# Patient Record
Sex: FEMALE | Race: BLACK OR AFRICAN AMERICAN | NOT HISPANIC OR LATINO | Employment: PART TIME | URBAN - METROPOLITAN AREA
[De-identification: names, ages, dates, MRNs, and addresses within clinical notes are randomized per-mention and may not be internally consistent; named-entity substitution may affect disease eponyms.]

---

## 2019-01-16 ENCOUNTER — HISTORICAL (OUTPATIENT)
Dept: LAB | Facility: HOSPITAL | Age: 19
End: 2019-01-16

## 2019-01-16 LAB
ABS NEUT (OLG): 2.48 X10(3)/MCL (ref 2.1–9.2)
AMPHET UR QL SCN: NORMAL
APPEARANCE, UA: ABNORMAL
BACTERIA SPEC CULT: ABNORMAL /HPF
BARBITURATE SCN PRESENT UR: NORMAL
BASOPHILS # BLD AUTO: 0 X10(3)/MCL (ref 0–0.2)
BASOPHILS NFR BLD AUTO: 0 %
BENZODIAZ UR QL SCN: NORMAL
BILIRUB UR QL STRIP: NEGATIVE
CANNABINOIDS UR QL SCN: NORMAL
COCAINE UR QL SCN: NORMAL
COLOR UR: YELLOW
EOSINOPHIL # BLD AUTO: 0.2 X10(3)/MCL (ref 0–0.9)
EOSINOPHIL NFR BLD AUTO: 4 %
ERYTHROCYTE [DISTWIDTH] IN BLOOD BY AUTOMATED COUNT: 12.3 % (ref 11.5–17)
GLUCOSE (UA): NEGATIVE
GROUP & RH: NORMAL
HBV SURFACE AG SERPL QL IA: NEGATIVE
HCT VFR BLD AUTO: 39.5 % (ref 37–47)
HGB BLD-MCNC: 12.8 GM/DL (ref 12–16)
HGB UR QL STRIP: ABNORMAL
HIV 1+2 AB+HIV1 P24 AG SERPL QL IA: NEGATIVE
KETONES UR QL STRIP: NEGATIVE
LEUKOCYTE ESTERASE UR QL STRIP: ABNORMAL
LYMPHOCYTES # BLD AUTO: 1.8 X10(3)/MCL (ref 0.6–4.6)
LYMPHOCYTES NFR BLD AUTO: 38 %
MCH RBC QN AUTO: 30.4 PG (ref 27–31)
MCHC RBC AUTO-ENTMCNC: 32.4 GM/DL (ref 33–36)
MCV RBC AUTO: 93.8 FL (ref 80–94)
MONOCYTES # BLD AUTO: 0.3 X10(3)/MCL (ref 0.1–1.3)
MONOCYTES NFR BLD AUTO: 6 %
NEUTROPHILS # BLD AUTO: 2.48 X10(3)/MCL (ref 2.1–9.2)
NEUTROPHILS NFR BLD AUTO: 52 %
NITRITE UR QL STRIP: NEGATIVE
OPIATES UR QL SCN: NORMAL
PCP UR QL: NORMAL
PH UR STRIP.AUTO: 5.5 [PH] (ref 5–7.5)
PH UR STRIP: 5.5 [PH] (ref 5–9)
PLATELET # BLD AUTO: 330 X10(3)/MCL (ref 130–400)
PMV BLD AUTO: 10.6 FL (ref 9.4–12.4)
PROT UR QL STRIP: NEGATIVE
RBC # BLD AUTO: 4.21 X10(6)/MCL (ref 4.2–5.4)
RBC #/AREA URNS HPF: ABNORMAL /[HPF]
SP GR FLD REFRACTOMETRY: 1.02 (ref 1–1.03)
SP GR UR STRIP: 1.02 (ref 1–1.03)
SQUAMOUS EPITHELIAL, UA: 18 /HPF (ref 0–4)
T PALLIDUM AB SER QL: NORMAL
UROBILINOGEN UR STRIP-ACNC: 0.2
WBC # SPEC AUTO: 4.8 X10(3)/MCL (ref 4.5–11.5)
WBC #/AREA URNS HPF: 17 /HPF (ref 0–3)

## 2019-01-18 LAB — FINAL CULTURE: NORMAL

## 2022-04-13 ENCOUNTER — HISTORICAL (OUTPATIENT)
Dept: ADMINISTRATIVE | Facility: HOSPITAL | Age: 22
End: 2022-04-13

## 2022-07-22 ENCOUNTER — HOSPITAL ENCOUNTER (EMERGENCY)
Facility: HOSPITAL | Age: 22
Discharge: HOME | End: 2022-07-23
Attending: EMERGENCY MEDICINE
Payer: MEDICAID

## 2022-07-22 DIAGNOSIS — R11.2 NON-INTRACTABLE VOMITING WITH NAUSEA, UNSPECIFIED VOMITING TYPE: Primary | ICD-10-CM

## 2022-07-22 LAB — B-HCG SERPL QL: NEGATIVE

## 2022-07-22 PROCEDURE — 99283 EMERGENCY DEPT VISIT LOW MDM: CPT

## 2022-07-22 PROCEDURE — 81025 URINE PREGNANCY TEST: CPT | Performed by: EMERGENCY MEDICINE

## 2022-07-22 PROCEDURE — 25000003 PHARM REV CODE 250: Performed by: EMERGENCY MEDICINE

## 2022-07-22 RX ORDER — AMLODIPINE BESYLATE 10 MG/1
10 TABLET ORAL
COMMUNITY
Start: 2021-10-21 | End: 2024-03-18

## 2022-07-22 RX ORDER — BUPROPION HYDROCHLORIDE 150 MG/1
150 TABLET ORAL
COMMUNITY
Start: 2021-10-21

## 2022-07-22 RX ORDER — ONDANSETRON 4 MG/1
4 TABLET, ORALLY DISINTEGRATING ORAL
Status: COMPLETED | OUTPATIENT
Start: 2022-07-22 | End: 2022-07-22

## 2022-07-22 RX ORDER — LISINOPRIL 20 MG/1
20 TABLET ORAL
COMMUNITY
Start: 2021-10-21 | End: 2024-03-18

## 2022-07-22 RX ADMIN — ONDANSETRON 4 MG: 4 TABLET, ORALLY DISINTEGRATING ORAL at 11:07

## 2022-07-22 NOTE — Clinical Note
"Annie Jamesclayton Anderson was seen and treated in our emergency department on 7/22/2022.  She may return to work on 07/25/2022.       If you have any questions or concerns, please don't hesitate to call.      Jamila Norris MD"

## 2022-07-23 VITALS
OXYGEN SATURATION: 98 % | RESPIRATION RATE: 18 BRPM | HEART RATE: 84 BPM | WEIGHT: 267 LBS | HEIGHT: 66 IN | BODY MASS INDEX: 42.91 KG/M2 | TEMPERATURE: 99 F | DIASTOLIC BLOOD PRESSURE: 88 MMHG | SYSTOLIC BLOOD PRESSURE: 146 MMHG

## 2022-07-23 RX ORDER — ONDANSETRON 4 MG/1
4 TABLET, ORALLY DISINTEGRATING ORAL EVERY 6 HOURS PRN
Qty: 10 TABLET | Refills: 0 | Status: SHIPPED | OUTPATIENT
Start: 2022-07-23

## 2022-07-23 NOTE — ED PROVIDER NOTES
Encounter Date: 2022       History     Chief Complaint   Patient presents with    Anxiety     After eating [employer] chicken I stood up and my body felt numb.I made myself throw up to see if it would make me fell hali     21-year-old female states that she ate some fried chicken and then afterwards started feeling numb in her body, her heart started racing and she started feeling like she was having a panic attack.  She put her finger in her throat made herself vomit which temporarily made her feel better but then she started feeling anxious again.  She denies any chest pain, abdominal pain, or dysuria.  She is not sure of her last menstrual period because she spots irregularly and states that she might be pregnant.      Anxiety  This is a recurrent problem. The current episode started less than 1 hour ago. The problem occurs rarely. The problem has been resolved. Associated symptoms comments: Nausea, vomiting.     Review of patient's allergies indicates:  No Known Allergies  Past Medical History:   Diagnosis Date    Anxiety disorder, unspecified     Hypertension     Preeclampsia      Past Surgical History:   Procedure Laterality Date     SECTION       History reviewed. No pertinent family history.     Review of Systems   Gastrointestinal: Positive for nausea.   Psychiatric/Behavioral: The patient is nervous/anxious.    All other systems reviewed and are negative.      Physical Exam     Initial Vitals [22 2302]   BP Pulse Resp Temp SpO2   (!) 164/69 92 20 98.7 °F (37.1 °C) 97 %      MAP       --         Physical Exam    Nursing note and vitals reviewed.  Constitutional: She appears well-developed and well-nourished. She is not diaphoretic. No distress.   HENT:   Head: Normocephalic and atraumatic.   Mouth/Throat: Oropharynx is clear and moist.   Eyes: Conjunctivae are normal. Pupils are equal, round, and reactive to light.   Neck: Neck supple.   Cardiovascular: Normal rate, regular rhythm,  normal heart sounds and intact distal pulses.   Pulmonary/Chest: Breath sounds normal. No respiratory distress. She has no wheezes. She has no rhonchi. She has no rales.   Abdominal: Abdomen is soft. Bowel sounds are normal. She exhibits no distension. There is no abdominal tenderness. There is no guarding.   Musculoskeletal:         General: No tenderness or edema. Normal range of motion.      Cervical back: Neck supple.     Neurological: She is alert and oriented to person, place, and time.   Skin: Skin is warm and dry. Capillary refill takes less than 2 seconds. No rash noted.   Psychiatric: She has a normal mood and affect. Thought content normal.         ED Course   Procedures  Labs Reviewed   PREGNANCY TEST, URINE RAPID - Normal          Imaging Results    None          Medications   ondansetron disintegrating tablet 4 mg (4 mg Oral Given 7/22/22 2345)     Medical Decision Making:   ED Management:  Patient was given Zofran 4 mg ODT and her symptoms have completely resolved.  At this point, she is thinking that she had a panic attack rather than some problem with her stomach or food.  I recommended that she follow-up with her PCP for further care.  Pregnancy test is negative.  All questions were answered and she is stable for discharge.                      Clinical Impression:   Final diagnoses:  [R11.2] Non-intractable vomiting with nausea, unspecified vomiting type (Primary)          ED Disposition Condition    Discharge Stable        ED Prescriptions     Medication Sig Dispense Start Date End Date Auth. Provider    ondansetron (ZOFRAN-ODT) 4 MG TbDL Take 1 tablet (4 mg total) by mouth every 6 (six) hours as needed (nausea). 10 tablet 7/23/2022  Jamlia Norris MD        Follow-up Information     Follow up With Specialties Details Why Contact Info    Iftikhar Palafox MD Internal Medicine In 3 days As needed [address]  [address]  [facility]  [id number]  [phone]             Jamila Norris MD  07/23/22 0009

## 2024-02-19 DIAGNOSIS — O09.212 SUPERVISION OF PREGNANCY WITH HISTORY OF PRE-TERM LABOR IN SECOND TRIMESTER: Primary | ICD-10-CM

## 2024-02-19 DIAGNOSIS — O10.012 PRE-EXISTING ESSENTIAL HYPERTENSION COMPLICATING PREGNANCY IN SECOND TRIMESTER: ICD-10-CM

## 2024-03-04 DIAGNOSIS — O09.212 SUPERVISION OF PREGNANCY WITH HISTORY OF PRE-TERM LABOR IN SECOND TRIMESTER: Primary | ICD-10-CM

## 2024-03-04 DIAGNOSIS — O10.012 PRE-EXISTING ESSENTIAL HYPERTENSION COMPLICATING PREGNANCY IN SECOND TRIMESTER: ICD-10-CM

## 2024-03-18 ENCOUNTER — PROCEDURE VISIT (OUTPATIENT)
Dept: MATERNAL FETAL MEDICINE | Facility: CLINIC | Age: 24
End: 2024-03-18
Payer: MEDICAID

## 2024-03-18 ENCOUNTER — OFFICE VISIT (OUTPATIENT)
Dept: MATERNAL FETAL MEDICINE | Facility: CLINIC | Age: 24
End: 2024-03-18
Payer: MEDICAID

## 2024-03-18 VITALS
SYSTOLIC BLOOD PRESSURE: 134 MMHG | DIASTOLIC BLOOD PRESSURE: 86 MMHG | BODY MASS INDEX: 41.04 KG/M2 | HEIGHT: 66 IN | HEART RATE: 90 BPM | WEIGHT: 255.38 LBS

## 2024-03-18 DIAGNOSIS — O09.42 GRAND MULTIPARITY WITH CURRENT PREGNANCY IN SECOND TRIMESTER: ICD-10-CM

## 2024-03-18 DIAGNOSIS — O09.90 AT HIGH RISK FOR COMPLICATIONS OF INTRAUTERINE PREGNANCY (IUP): ICD-10-CM

## 2024-03-18 DIAGNOSIS — O09.299 HX OF PREECLAMPSIA, PRIOR PREGNANCY, CURRENTLY PREGNANT: Primary | ICD-10-CM

## 2024-03-18 DIAGNOSIS — O10.012 PRE-EXISTING ESSENTIAL HYPERTENSION COMPLICATING PREGNANCY IN SECOND TRIMESTER: ICD-10-CM

## 2024-03-18 DIAGNOSIS — Z03.75 SUSPECTED CERVICAL SHORTENING NOT FOUND: ICD-10-CM

## 2024-03-18 DIAGNOSIS — O09.212 SUPERVISION OF PREGNANCY WITH HISTORY OF PRE-TERM LABOR IN SECOND TRIMESTER: ICD-10-CM

## 2024-03-18 DIAGNOSIS — O09.299 PRIOR PREGNANCY COMPLICATED BY IUGR, ANTEPARTUM: ICD-10-CM

## 2024-03-18 PROCEDURE — 99204 OFFICE O/P NEW MOD 45 MIN: CPT | Mod: TH,S$GLB,, | Performed by: OBSTETRICS & GYNECOLOGY

## 2024-03-18 PROCEDURE — 3079F DIAST BP 80-89 MM HG: CPT | Mod: CPTII,S$GLB,, | Performed by: OBSTETRICS & GYNECOLOGY

## 2024-03-18 PROCEDURE — 76811 OB US DETAILED SNGL FETUS: CPT | Mod: S$GLB,,, | Performed by: OBSTETRICS & GYNECOLOGY

## 2024-03-18 PROCEDURE — 3008F BODY MASS INDEX DOCD: CPT | Mod: CPTII,S$GLB,, | Performed by: OBSTETRICS & GYNECOLOGY

## 2024-03-18 PROCEDURE — 1160F RVW MEDS BY RX/DR IN RCRD: CPT | Mod: CPTII,S$GLB,, | Performed by: OBSTETRICS & GYNECOLOGY

## 2024-03-18 PROCEDURE — 1159F MED LIST DOCD IN RCRD: CPT | Mod: CPTII,S$GLB,, | Performed by: OBSTETRICS & GYNECOLOGY

## 2024-03-18 PROCEDURE — 3075F SYST BP GE 130 - 139MM HG: CPT | Mod: CPTII,S$GLB,, | Performed by: OBSTETRICS & GYNECOLOGY

## 2024-03-18 PROCEDURE — 76817 TRANSVAGINAL US OBSTETRIC: CPT | Mod: S$GLB,,, | Performed by: OBSTETRICS & GYNECOLOGY

## 2024-03-18 RX ORDER — ASPIRIN 81 MG/1
81 TABLET ORAL DAILY
Qty: 30 TABLET | Refills: 10 | Status: SHIPPED | OUTPATIENT
Start: 2024-03-18 | End: 2025-03-18

## 2024-03-18 RX ORDER — CEFDINIR 300 MG/1
300 CAPSULE ORAL
COMMUNITY
Start: 2024-01-24 | End: 2024-03-18

## 2024-03-18 RX ORDER — LABETALOL 200 MG/1
200 TABLET, FILM COATED ORAL 3 TIMES DAILY
COMMUNITY

## 2024-03-18 NOTE — PROGRESS NOTES
Maternal Fetal Medicine Consult    Subjective     Patient ID: [id number]    Chief Complaint: mfm consult w/us (H/O PTD, CHTN)      HPI: 23 y.o.  female  at Unknown gestation with Estimated Date of Delivery: 24. by LMP, consistent with early US. She is sent for MFM consultation for Hx PTD, CHTN.      She has chronic hypertension diagnosed after 2019 pregnancy and is currently on labetalol 200 mg q.8 hours.  She is on low-dose aspirin daily.  She has history of 2 indicated  deliveries, 1st at at 26 weeks in her 2nd pregnancy and 2nd at 25 weeks in her 4th pregnancy. She reports in her 2019 pregnancy, she had eclampsia requiring delivery at 26 weeks.  She delivered at [facility], and records are currently unavailable (getting ALICIA).  In her  pregnancy, she had indicated  delivery at 25 weeks due to preeclampsia, fetal growth restriction, and oligohydramnios.  Reviewed op report and  delivery was a classical  section.  She has had 3 total C-sections, at least 1 of which was a classical .  This is a short interval pregnancy, with her last delivery in 2023 that was a classical .  She has increased BMI of 41 at IOB visit.       She denies any personal or family history of aneuploidy or anomalies. She denies any exposure to high fevers, viral rashes, illicit drugs or alcohol in this pregnancy.  She denies any leaking fluid, vaginal bleeding, contractions, decreased fetal movement. Denies headaches, visual disturbances, or epigastric pain.       Pregnancy complications include:  Patient Active Problem List   Diagnosis    Pre-existing essential hypertension complicating pregnancy in second trimester    Supervision of pregnancy with history of pre-term labor in second trimester    Hx of preeclampsia, prior pregnancy, currently pregnant    Prior pregnancy complicated by IUGR, antepartum    At high risk for complications of intrauterine pregnancy  (IUP)       Past Medical History:   Diagnosis Date    Anxiety disorder, unspecified     not currently, comes and goes    Eclampsia added to pre-existing hypertension 2019    While pregnant    Postpartum depression 2020    Preeclampsia     While pregnant    Seizures 2019    Only during this labor due to eclampsia/ does not catch them anymore.       Past Surgical History:   Procedure Laterality Date     SECTION  2019       Family History   Problem Relation Age of Onset    Stroke Father     Hypertension Mother        Social History     Socioeconomic History    Marital status: Single   Tobacco Use    Smoking status: Never     Passive exposure: Current    Smokeless tobacco: Never   Substance and Sexual Activity    Alcohol use: Not Currently    Drug use: Never    Sexual activity: Yes     Partners: Male       Current Outpatient Medications   Medication Sig Dispense Refill    labetaloL (NORMODYNE) 200 MG tablet Take 200 mg by mouth 3 (three) times daily.      albuterol sulfate 90 mcg/actuation aebs Inhale into the lungs.      buPROPion (WELLBUTRIN XL) 150 MG TB24 tablet Take 150 mg by mouth.      ondansetron (ZOFRAN-ODT) 4 MG TbDL Take 1 tablet (4 mg total) by mouth every 6 (six) hours as needed (nausea). (Patient not taking: Reported on 3/18/2024) 10 tablet 0    prenatal vit no.124-iron-folic 27 mg iron- 800 mcg Tab Take by mouth.       No current facility-administered medications for this visit.       Review of patient's allergies indicates:   Allergen Reactions    Dog dander      Reaction is sneezing       Medications:  Current Outpatient Medications   Medication Instructions    albuterol sulfate 90 mcg/actuation aebs Inhalation    buPROPion (WELLBUTRIN XL) 150 mg, Oral    labetaloL (NORMODYNE) 200 mg, Oral, 3 times daily    ondansetron (ZOFRAN-ODT) 4 mg, Oral, Every 6 hours PRN    prenatal vit no.124-iron-folic 27 mg iron- 800 mcg Tab Oral       Review of Systems   12 point review of systems conducted,  negative except as stated in the history of present illness. See HPI for details.      Objective     There were no vitals taken for this visit.     Physical Exam  Vitals and nursing note reviewed.   Constitutional:       General: She is not in acute distress.     Appearance: Normal appearance.      Comments: Increased BMI   HENT:      Head: Normocephalic and atraumatic.      Nose: Nose normal. No congestion.      Mouth/Throat:      Pharynx: Oropharynx is clear.   Eyes:      General: No scleral icterus.     Conjunctiva/sclera: Conjunctivae normal.   Cardiovascular:      Rate and Rhythm: Normal rate and regular rhythm.   Pulmonary:      Effort: No respiratory distress.      Breath sounds: Normal breath sounds. No wheezing.   Abdominal:      General: Abdomen is flat.      Palpations: Abdomen is soft.      Tenderness: There is no abdominal tenderness. There is no right CVA tenderness, left CVA tenderness or guarding.      Comments: No CVA tenderness gravid uterus.    Musculoskeletal:         General: Normal range of motion.      Cervical back: Neck supple.      Right lower leg: No edema.      Left lower leg: No edema.   Skin:     General: Skin is warm.      Findings: No bruising or rash.   Neurological:      General: No focal deficit present.      Mental Status: She is oriented to person, place, and time.      Deep Tendon Reflexes: Reflexes normal.      Comments: Normal reflexes   Psychiatric:         Mood and Affect: Mood normal.         Behavior: Behavior normal.         Thought Content: Thought content normal.         Judgment: Judgment normal.         ASSESSMENT/PLAN:     23 y.o.  female with IUP at Unknown    Chronic hypertension in pregnancy  There is normal fetal growth and no anomalies seen on fetal anatomy scan on 3/18/2024.  AFV is normal.    Chronic hypertension complicates up to 2-5% of pregnancies and is associated with significant adverse pregnancy outcomes including  birth, fetal growth  restriction, fetal demise, placental abruption, and  delivery. Recent data suggest higher risk of certain congenital anomalies, including, heart defects, hypospadias and esophageal atresia. The incidence of adverse outcomes seen in pregnancies complicated by chronic hypertension is related to the degree and duration of hypertension and to the association of other organ system involvement or damage. Most pregnant women with mild chronic hypertension have uneventful pregnancies with no end-organ involvement. Comparing women who developed superimposed preeclampsia with women who did not, the incidence of  birth was 100% versus 38%, the incidence of small-for-gestational-age (SGA) infants was 78% versus 15%, and the  mortality rate was 48% versus 0%. Besides superimposed preeclampsia or eclampsia, pregnancy complicated by chronic hypertension (especially if severe) may be associated with worsening or malignant hypertension, central nervous system hemorrhage, cardiac decompensation, and renal deterioration or failure.    The age of onset, results of previous evaluation, severity and duration of hypertension, and physical examination are important determinants of which clinical tests may be useful. Ideally, a woman with chronic hypertension should be evaluated before pregnancy to ascertain potentially reversible causes and end-organ involvement (eg, heart or kidney). Baseline laboratory tests may include serum creatinine, blood urea nitrogen, electrolytes, CBC and 24-hour urine evaluation for total protein and creatinine clearance. Women who have had hypertension for several years are more likely to have cardiomegaly, ischemic heart disease, renal involvement, and retinopathy. In patients with severe disease over 4 years, or long standing hypertension (typically if over 30 years old), tests which may prove useful in the evaluation of these women include electrocardiography, echocardiography,  "ophthalmologic examination, and renal ultrasonography.     Women with paroxysmal hypertension, frequent "hypertensive crisis," seizure disorders, or anxiety attacks should be evaluated for pheochromocytoma with measurements of 24-hour urine vanillylmandelic acid, metanephrines, or unconjugated catecholamines. Magnetic resonance imaging after the first trimester or computed tomography may be useful for adrenal tumor localization. Renal artery stenosis appears to be more prevalent in patients with type-2 diabetes and coexistent hypertension. Doppler flow studies or magnetic resonance angiography are helpful to detect renal artery stenosis. Negative results from renal ultrasonography do not exclude renal artery stenosis.     In women with chronic hypertension, it can be difficult to discern worsening hypertension that might occur as a result of physiological changes of pregnancy in the third trimester from the development of preeclampsia. The use of certain laboratory tests such as serum creatinine, liver functions tests, hematocrit and platelets to compare to baseline values from early in pregnancy might serve to delineate these conditions. Routine screening of women with chronic hypertension with these laboratory tests is not routinely indicated.    I have shared with her the normal natural course of chronic hypertension in pregnancy with improvement early on and likely increasing blood pressure as the pregnancy advances. Based on findings of recent CHAP Study, it is recommended to utilize 140/90 as the threshold for initiation or titration of medical therapy for chronic hypertension in pregnancy, rather than the previously recommended threshold of 160/110. For patients on blood pressure medications at the start of pregnancy, in the absence of mitigating factors or side effects, they can be maintained on their medications, rather than discontinuing them and waiting to initiate treatment for blood pressures in the " severe range. Commonly used medications include nifedipine and labetalol.    BP Readings from Last 1 Encounters:   22 (!) 146/88     With this blood pressure, and repeat blood pressure of 134/86 she was advised to continue low salt diet, and continue labetalol at 200 mg q 8 hours .  She is also to follow a low sodium diet avoiding any excessive weight gain.     Use aspirin as discussed.    She was advised of the higher risk of fetal growth restriction and superimposed preeclampsia with her underlying chronic hypertension. The risk of placental abruption was also discussed. No prevention is available with her reporting any bleeding or cramping. She was also advised to report any headaches, visual problems, epigastric pain.    There is no consensus as to the most appropriate fetal surveillance test(s) or the interval and timing of testing in  with chronic hypertension. Thus, such testing should be individualized and based on clinical judgment and on severity of disease.    We will plan to do follow-up ultrasounds to monitor growth every 4 weeks until the end of pregnancy. Recommend fetal testing starting around 32 weeks gestation until the end of pregnancy    Delivery as below.      History of fetal growth restriction in 3 previous pregnancies  Discussed potential risk of recurrence of growth restriction in subsequent pregnancies. There is normal fetal growth today.  We will plan to recheck fetal growth in about 4 weeks. She is to do fetal kick counts 3x/daily with immediate reporting of decreased fetal movements (<10 movements/hr).         History of severe preeclampsia and fetal growth restriction requiring delivery at 25 weeks in previous pregnancy  I discussed with her higher risk of recurrence even at an earlier gestational age with morbidity and mortality associated with that. I advised her of the option of the potential benefit of baby aspirin to decrease risk of recurrence that outweighs potential  risk associated with its use. Low-dose aspirin as preventive medication does not increase the risk for placental abruption, postpartum hemorrhage, or fetal intracranial bleeding. Low-dose aspirin (range, 60 to 150 mg/d) reduced the risk for preeclampsia by 24% in clinical trials and reduced the risk for  birth by 14% and FGR by 20%.      With her risk factors, including  preeclampsia/GHTN in a previous pregnancy and chronic hypertension BMI over 30,  ancestry, low socioeconomic status, and previous low birthweight or SGA baby, it was agreed to start asa 81 mg daily today and continue until delivery.. Also recommend baby aspirin use in all future pregnancies starting at 12 weeks and work on having healthy weight prior to any future pregnancy. Questions answered. Patient verbalized understanding.     We are requesting the records from her 2019 hospitalization and op report.       History of 3 previous C-sections (1 classical )  Patient was counseled that a previous classical  carries with it risk of uterine rupture around 5% with labor with need to avoid that close to term. Current standard of care recommends delivery at 36-37 weeks, as optimal balance between prematurity and risks of continued pregnancy, labor, uterine rupture and fetal death/morbidity. Even though there is increased  morbidity and mortality associated with delivery at 36-37 weeks, the risks of continued pregnancy to fetus and mom outweigh the risks of prematurity beyond 37 weeks. Patient clearly instructed to come in immediately, if increased cramps (more than 5 per hour), increased vaginal discharge and or spotting that are not resolving with hydration and rest.    With previous  sections, there is risk for placenta accreta that may not be detected antenatally and even if not present, there is still increased risks for  hemorrhage because of difficulty suman lower uterine segment.  Different approaches to stop such bleeding and possible need for  hysterectomy was discussed.   There is option to consider MRI if suspicion for placental abnormality. If no suspicion for placental abnormality, no MRI will be recommended and will do expectant management, understanding that neither the MRI nor the US is 100% sensitive or specific. Actually, most current data, suggest that an MRI is not superior to us, and should not be routinely done if suspected acreta, as both false positive and false negative are increased with such an approach.     With chronic hypertension on medication, history of fetal growth restriction and severe preeclampsia requiring delivery at 25 weeks via classical , and short interval pregnancy, recommend delivery at 36 weeks' gestation (36-36 6/7th weeks) as optimal balance between prematurity risks and risks of continued pregnancy. Earlier delivery may be needed for worsening maternal or fetal status.  We will plan for her to receive a course of steroids prior to delivery.      Short interval pregnancy  A short interpregnancy interval is defined as an interval between the delivery of one pregnancy and the conception of a subsequent pregnancy. Most adverse pregnancy outcomes are associated with an IPI of < 6 months. A short IPI is associated with increased risk of maternal anemia, PPROM/PTB, delivery of a SGA infant, and placental abruption. Additionally, the risk of recurrent preeclampsia is higher with an IPI of < 12 months. Maternal anemia due to iron deficiency should be corrected. Additionally, the risk of uterine rupture is higher in women with a short interdelivery interval (< 18 months- birth to birth interval). Additionally,risk of TOLAC failure is higher and therefore, women should be counseled accordingly. FKC and  labor instructions given.       Increased BMI in pregnancy  Body mass index is 41.22 kg/m².    I discussed with her the risk of first  trimester miscarriage, recurrent miscarriages, congenital anomalies, hypertensive disorders, gestational diabetes,  delivery (BMI>35), macrosomia, higher risk of fetal demise in-utero and higher risk of . She was advised of the importance of eating healthy and and limiting weight gain to 11-15 lbs during the pregnancy, as optimal in this situation.  I recommended low calorie, low fat diet avoiding any additional excessive weight gain.  Excess weight gain would be associated with gestational hypertension, gestational diabetes and adverse  outcomes, including fetal demise in utero.    Low dose aspirin as discussed.    Preoperative antibiotics and thromboprophylaxis with use of pneumatic compression devices is recommend in those with very elevated BMI undergoing  delivery. Thromboprophylaxis should also be used with those on prolonged immobilization.    Discussed the importance of losing weight after this pregnancy, especially before attempting future pregnancy. Breastfeeding may be an important tool in reducing the postpartum weight retention. Fetal risks were discussed with short term risk of fetal/ obesity and long term risk of adolescent component of metabolic syndrome.    Follow a healthy low caloric diet.    With elevated BMI, will do fetal testing as recommended elsewhere in this note. She was advised to do fetal kick counts 3 times daily after 24 weeks, with immediate reporting of decreased fetal movements (<10 movements/hr).      Grand multiparity  She was advised of the increased risk of malpresentation, abnormal labors, and risk of postpartum hemorrhage and expectant management needs to be done. Consider using prophylactic dose of uterotonic agents after delivery to reduce the risk of postpartum hemorrhage.    Recommend flagging the chart on admission as high risk for postpartum hemorrhage to have blood type and screened and be prepared in the event that that happens.      Patient was counseled on management of chronic hypertension in the very high-risk of recurrence of severe preeclampsia.  Unfortunately she has not started the recommended baby aspirin yet and will started today.  Preeclampsia warnings given.  With at least 1 classical  section before, and history of chronic hypertension, and short interval pregnancy, recommend delivery at 36 weeks' gestation as optimal balance between prematurity risks and risk of continued pregnancy with a course of steroids to be given prior to delivery.  Patient will be taking the daily aspirin today diet advice given.  Patient was advised against any future pregnancy with 3 previous  sections including at least 1 classical  section.  Because of reported history of  delivery a transvaginal ultrasound was done that showed normal cervical length with no follow-up ultrasounds for cervical length measurements needed as the patient earlier delivery where because of the indicated early delivery.       Follow up in about 4 weeks (around 4/15/2024) for MFM follow-up, Repeat ultrasound, Room 1 or 2.     No future appointments.     Patient was evaluated by DOTTIE Hartman and Dr. Minaya.  Final assessment and recommendations as stated above were made by Dr. Minaya.    This note was created with the assistance of [employer] voice recognition software. There may be transcription errors as a result of using this technology, however minimal. Effort has been made to ensure accuracy of transcription, but any obvious errors or omissions should be clarified with the author of the document.

## 2024-04-18 ENCOUNTER — HOSPITAL ENCOUNTER (EMERGENCY)
Facility: HOSPITAL | Age: 24
Discharge: HOME | End: 2024-04-18
Attending: OBSTETRICS & GYNECOLOGY
Payer: MEDICAID

## 2024-04-18 VITALS
RESPIRATION RATE: 17 BRPM | TEMPERATURE: 99 F | OXYGEN SATURATION: 100 % | SYSTOLIC BLOOD PRESSURE: 144 MMHG | HEIGHT: 66 IN | HEART RATE: 80 BPM | BODY MASS INDEX: 40.82 KG/M2 | DIASTOLIC BLOOD PRESSURE: 80 MMHG | WEIGHT: 254 LBS

## 2024-04-18 DIAGNOSIS — O09.299 HX OF PREECLAMPSIA, PRIOR PREGNANCY, CURRENTLY PREGNANT: ICD-10-CM

## 2024-04-18 DIAGNOSIS — Z03.75 SUSPECTED CERVICAL SHORTENING NOT FOUND: ICD-10-CM

## 2024-04-18 DIAGNOSIS — O16.9 HYPERTENSION AFFECTING PREGNANCY: Primary | ICD-10-CM

## 2024-04-18 DIAGNOSIS — O10.012 PRE-EXISTING ESSENTIAL HYPERTENSION COMPLICATING PREGNANCY IN SECOND TRIMESTER: Primary | ICD-10-CM

## 2024-04-18 LAB
ALBUMIN SERPL-MCNC: 2.9 G/DL (ref 3.5–5)
ALBUMIN/GLOB SERPL: 0.8 RATIO (ref 1.1–2)
ALP SERPL-CCNC: 62 UNIT/L (ref 40–150)
ALT SERPL-CCNC: 7 UNIT/L (ref 0–55)
APPEARANCE UR: CLEAR
AST SERPL-CCNC: 9 UNIT/L (ref 5–34)
BACTERIA #/AREA URNS AUTO: ABNORMAL /HPF
BASOPHILS # BLD AUTO: 0.02 X10(3)/MCL
BASOPHILS NFR BLD AUTO: 0.3 %
BILIRUB SERPL-MCNC: 0.3 MG/DL
BILIRUB UR QL STRIP.AUTO: NEGATIVE
BUN SERPL-MCNC: 6.9 MG/DL (ref 7–18.7)
CALCIUM SERPL-MCNC: 9.3 MG/DL (ref 8.4–10.2)
CHLORIDE SERPL-SCNC: 106 MMOL/L (ref 98–107)
CO2 SERPL-SCNC: 20 MMOL/L (ref 22–29)
COLOR UR AUTO: COLORLESS
CREAT SERPL-MCNC: 0.61 MG/DL (ref 0.55–1.02)
CREAT UR-MCNC: 26.9 MG/DL (ref 45–106)
EOSINOPHIL # BLD AUTO: 0.41 X10(3)/MCL (ref 0–0.9)
EOSINOPHIL NFR BLD AUTO: 5.8 %
ERYTHROCYTE [DISTWIDTH] IN BLOOD BY AUTOMATED COUNT: 12.9 % (ref 11.5–17)
GFR SERPLBLD CREATININE-BSD FMLA CKD-EPI: >60 MLS/MIN/1.73/M2
GLOBULIN SER-MCNC: 3.8 GM/DL (ref 2.4–3.5)
GLUCOSE SERPL-MCNC: 76 MG/DL (ref 74–100)
GLUCOSE UR QL STRIP.AUTO: NORMAL
HCT VFR BLD AUTO: 34.4 % (ref 37–47)
HGB BLD-MCNC: 11.5 G/DL (ref 12–16)
IMM GRANULOCYTES # BLD AUTO: 0.02 X10(3)/MCL (ref 0–0.04)
IMM GRANULOCYTES NFR BLD AUTO: 0.3 %
KETONES UR QL STRIP.AUTO: NEGATIVE
LDH SERPL-CCNC: 147 U/L (ref 125–220)
LEUKOCYTE ESTERASE UR QL STRIP.AUTO: 75
LYMPHOCYTES # BLD AUTO: 1.8 X10(3)/MCL (ref 0.6–4.6)
LYMPHOCYTES NFR BLD AUTO: 25.2 %
MCH RBC QN AUTO: 32 PG (ref 27–31)
MCHC RBC AUTO-ENTMCNC: 33.4 G/DL (ref 33–36)
MCV RBC AUTO: 95.8 FL (ref 80–94)
MONOCYTES # BLD AUTO: 0.51 X10(3)/MCL (ref 0.1–1.3)
MONOCYTES NFR BLD AUTO: 7.2 %
NEUTROPHILS # BLD AUTO: 4.37 X10(3)/MCL (ref 2.1–9.2)
NEUTROPHILS NFR BLD AUTO: 61.2 %
NITRITE UR QL STRIP.AUTO: NEGATIVE
NRBC BLD AUTO-RTO: 0 %
PH UR STRIP.AUTO: 6.5 [PH]
PLATELET # BLD AUTO: 293 X10(3)/MCL (ref 130–400)
PMV BLD AUTO: 11.3 FL (ref 7.4–10.4)
POTASSIUM SERPL-SCNC: 3.9 MMOL/L (ref 3.5–5.1)
PROT SERPL-MCNC: 6.7 GM/DL (ref 6.4–8.3)
PROT UR QL STRIP.AUTO: NEGATIVE
PROT UR STRIP-MCNC: <6.8 MG/DL
RBC # BLD AUTO: 3.59 X10(6)/MCL (ref 4.2–5.4)
RBC #/AREA URNS AUTO: ABNORMAL /HPF
RBC UR QL AUTO: NEGATIVE
SODIUM SERPL-SCNC: 137 MMOL/L (ref 136–145)
SP GR UR STRIP.AUTO: 1 (ref 1–1.03)
SPERM URNS QL MICRO: ABNORMAL /HPF
SQUAMOUS #/AREA URNS LPF: ABNORMAL /HPF
URATE SERPL-MCNC: 4.4 MG/DL (ref 2.6–6)
UROBILINOGEN UR STRIP-ACNC: NORMAL
WBC # SPEC AUTO: 7.13 X10(3)/MCL (ref 4.5–11.5)
WBC #/AREA URNS AUTO: ABNORMAL /HPF

## 2024-04-18 PROCEDURE — 85025 COMPLETE CBC W/AUTO DIFF WBC: CPT | Performed by: OBSTETRICS & GYNECOLOGY

## 2024-04-18 PROCEDURE — 81001 URINALYSIS AUTO W/SCOPE: CPT | Performed by: OBSTETRICS & GYNECOLOGY

## 2024-04-18 PROCEDURE — 84156 ASSAY OF PROTEIN URINE: CPT | Performed by: OBSTETRICS & GYNECOLOGY

## 2024-04-18 PROCEDURE — 99284 EMERGENCY DEPT VISIT MOD MDM: CPT | Mod: 25

## 2024-04-18 PROCEDURE — 84550 ASSAY OF BLOOD/URIC ACID: CPT | Performed by: OBSTETRICS & GYNECOLOGY

## 2024-04-18 PROCEDURE — 25000003 PHARM REV CODE 250: Performed by: OBSTETRICS & GYNECOLOGY

## 2024-04-18 PROCEDURE — 63600175 PHARM REV CODE 636 W HCPCS: Performed by: OBSTETRICS & GYNECOLOGY

## 2024-04-18 PROCEDURE — 80053 COMPREHEN METABOLIC PANEL: CPT | Performed by: OBSTETRICS & GYNECOLOGY

## 2024-04-18 PROCEDURE — 83615 LACTATE (LD) (LDH) ENZYME: CPT | Performed by: OBSTETRICS & GYNECOLOGY

## 2024-04-18 PROCEDURE — 96360 HYDRATION IV INFUSION INIT: CPT

## 2024-04-18 PROCEDURE — 82570 ASSAY OF URINE CREATININE: CPT | Performed by: OBSTETRICS & GYNECOLOGY

## 2024-04-18 RX ORDER — BUTALBITAL, ACETAMINOPHEN AND CAFFEINE 50; 325; 40 MG/1; MG/1; MG/1
1 TABLET ORAL
Status: DISCONTINUED | OUTPATIENT
Start: 2024-04-18 | End: 2024-04-18

## 2024-04-18 RX ORDER — BUTALBITAL, ACETAMINOPHEN AND CAFFEINE 50; 325; 40 MG/1; MG/1; MG/1
1 TABLET ORAL EVERY 4 HOURS PRN
Qty: 30 TABLET | Refills: 1 | Status: SHIPPED | OUTPATIENT
Start: 2024-04-18 | End: 2024-05-30

## 2024-04-18 RX ORDER — BUTALBITAL, ACETAMINOPHEN AND CAFFEINE 50; 325; 40 MG/1; MG/1; MG/1
1 TABLET ORAL
Status: COMPLETED | OUTPATIENT
Start: 2024-04-18 | End: 2024-04-18

## 2024-04-18 RX ADMIN — BUTALBITAL, ACETAMINOPHEN, AND CAFFEINE 1 TABLET: 50; 325; 40 TABLET ORAL at 01:04

## 2024-04-18 RX ADMIN — BUTALBITAL, ACETAMINOPHEN, AND CAFFEINE 1 TABLET: 50; 325; 40 TABLET ORAL at 04:04

## 2024-04-18 RX ADMIN — SODIUM CHLORIDE, POTASSIUM CHLORIDE, SODIUM LACTATE AND CALCIUM CHLORIDE 1000 ML: 600; 310; 30; 20 INJECTION, SOLUTION INTRAVENOUS at 02:04

## 2024-04-18 NOTE — ED PROVIDER NOTES
Encounter Date: 2024       History     Chief Complaint   Patient presents with    Vaginal Pain     Pt c/o vaginal pain since 18 weeks of pregnancy. Pt c/o H/A since this morning.     HPI  Review of patient's allergies indicates:   Allergen Reactions    Dog dander      Reaction is sneezing     Past Medical History:   Diagnosis Date    Anxiety disorder, unspecified     not currently, comes and goes    Eclampsia added to pre-existing hypertension     While pregnant    Postpartum depression     Preeclampsia     While pregnant    Seizures 2019    Only during this labor due to eclampsia/ does not catch them anymore.     Past Surgical History:   Procedure Laterality Date     SECTION      , ,      Family History   Problem Relation Name Age of Onset    Stroke Father      Hypertension Mother       Social History     Tobacco Use    Smoking status: Never     Passive exposure: Current    Smokeless tobacco: Never   Substance Use Topics    Alcohol use: Not Currently    Drug use: Never     Review of Systems    Physical Exam     Initial Vitals   BP Pulse Resp Temp SpO2   24 1334 24 1334 24 1334 24 1334 24 1346   (!) 140/78 80 17 98.5 °F (36.9 °C) 100 %      MAP       --                Physical Exam    ED Course   Procedures  Labs Reviewed   COMPREHENSIVE METABOLIC PANEL - Abnormal; Notable for the following components:       Result Value    Carbon Dioxide 20 (*)     Blood Urea Nitrogen 6.9 (*)     Albumin Level 2.9 (*)     Globulin 3.8 (*)     Albumin/Globulin Ratio 0.8 (*)     All other components within normal limits   CBC WITH DIFFERENTIAL - Abnormal; Notable for the following components:    RBC 3.59 (*)     Hgb 11.5 (*)     Hct 34.4 (*)     MCV 95.8 (*)     MCH 32.0 (*)     MPV 11.3 (*)     All other components within normal limits   PROTEIN / CREATININE RATIO, URINE - Abnormal; Notable for the following components:    Urine Creatinine 26.9 (*)     All other  components within normal limits    Narrative:     Unable to calculate urine Protein/Creatinine Ratio.   URINALYSIS, REFLEX TO URINE CULTURE - Abnormal; Notable for the following components:    Leukocyte Esterase, UA 75 (*)     WBC, UA 6-10 (*)     Bacteria, UA Occasional (*)     Squamous Epithelial Cells, UA Occasional (*)     Sperm, UA Trace (*)     All other components within normal limits   LACTATE DEHYDROGENASE - Normal   URIC ACID - Normal   CBC W/ AUTO DIFFERENTIAL    Narrative:     The following orders were created for panel order CBC auto differential.  Procedure                               Abnormality         Status                     ---------                               -----------         ------                     CBC with Differential[[id number]]       Abnormal            Final result                 Please view results for these tests on the individual orders.          Imaging Results    None          Medications   butalbital-acetaminophen-caffeine -40 mg per tablet 1 tablet (1 tablet Oral Given 24 1353)   lactated ringers bolus 1,000 mL (0 mLs Intravenous Stopped 24 1506)   butalbital-acetaminophen-caffeine -40 mg per tablet 1 tablet (1 tablet Oral Given 24 1619)     Medical Decision Making  Amount and/or Complexity of Data Reviewed  Labs: ordered.    Risk  Prescription drug management.                                      Clinical Impression:   This  @ 26wks reports vaginal pain, ongoing for several weeks and a persistent headache. Pregnancy is complicated by HTN, and the patient reports pre-eclampsia in prior pregnancies. Reports no vision changes, SOB, N/V.  Reflexes wnl    BP's: labile  UA: negative    A/P: Ruled out PEC  -will send home with Fiorecet and a 24hr urine protein collection  -continue labetalol  -PEC precautions given  -discharge home  -follow up with Dr. Day     ED Disposition Condition    Discharge           ED Prescriptions    None        Follow-up Information    None          Emile Lucero MD  04/18/24 3711

## 2024-04-22 PROBLEM — Z03.75 SUSPECTED CERVICAL SHORTENING NOT FOUND: Status: RESOLVED | Noted: 2024-03-18 | Resolved: 2024-04-22

## 2024-05-07 ENCOUNTER — HOSPITAL ENCOUNTER (EMERGENCY)
Facility: HOSPITAL | Age: 24
Discharge: HOME | End: 2024-05-07
Payer: MEDICAID

## 2024-05-07 VITALS
OXYGEN SATURATION: 100 % | DIASTOLIC BLOOD PRESSURE: 65 MMHG | TEMPERATURE: 98 F | SYSTOLIC BLOOD PRESSURE: 131 MMHG | HEIGHT: 66 IN | HEART RATE: 89 BPM | BODY MASS INDEX: 40.82 KG/M2 | WEIGHT: 254 LBS

## 2024-05-07 PROBLEM — O09.43 GRAND MULTIPARITY WITH CURRENT PREGNANCY IN THIRD TRIMESTER: Status: ACTIVE | Noted: 2024-03-18

## 2024-05-07 PROBLEM — O10.013 PRE-EXISTING ESSENTIAL HYPERTENSION COMPLICATING PREGNANCY IN THIRD TRIMESTER: Status: ACTIVE | Noted: 2024-03-18

## 2024-05-07 PROCEDURE — 99284 EMERGENCY DEPT VISIT MOD MDM: CPT

## 2024-05-07 NOTE — DISCHARGE INSTRUCTIONS
Keep scheduled appointments with Dr. Minaya and Dr. Day.       Return or call for any needs or concerns.

## 2024-05-07 NOTE — ED PROVIDER NOTES
"       DESIREE NOTE     Admit Date: 2024  DESIREE Physician: Marcial Cruz  Primary OBGYN: Dr. Daniel Day    Chief Complaint   Patient presents with    Abdominal Pain       Hospital Course:  Annie Anderson is a 23 y.o.  at 28w5d presents complaining of lower abdominal pelvic and suprapubic pain.  Patient reports pain has been increased in the last couple of days.    This IUP is complicated by history of  labor history of preeclampsia.  Previous ..    Patient denies vaginal bleeding, leakage of fluid, contractions, headache, vision changes, RUQ pain, dysuria, fever, and nausea/vomiting.  Fetal Movement: normal.      /65   Pulse 89   Temp 98.2 °F (36.8 °C) (Oral)   Ht 5' 6" (1.676 m)   Wt 115.2 kg (254 lb)   LMP 10/19/2023 (Exact Date)   SpO2 100%   BMI 41.00 kg/m²   Temp:  [98.2 °F (36.8 °C)] 98.2 °F (36.8 °C)  Pulse:  [83-89] 89  SpO2:  [100 %] 100 %    General: in no apparent distress  Cardiovascular: regular rate and rhythm no murmurs  Respiratory: clear to auscultation, no wheezes, rales or rhonchi, symmetric air entry  Abdominal: fundus soft, nontender except suprapubic area consistent with fetal presenting part.  Back: lumbar tenderness present CVA tenderness none  Extremeties no redness or tenderness in the calves or thighs no edema    SSE:   SVE:  Deferred      FHT: Category 1  TOCO:  No contractions are noted    Medical Decision Making:  Normal 3rd trimester pregnancy pains versus signs and symptoms of  labor were discussed with the patient.  Questions were answered to her satisfaction.    LABS:   No results found for this or any previous visit (from the past 24 hour(s)).  [unfilled]     Imaging Results    None          ASSESMENT: Annie Anderson is a 23 y.o.   at 28w5d with lower abdominal round ligament type pain.    Discharge Diagnosis/Clinical Impression:  Pregnancy 28 weeks.  Round ligament " pain.    Status:Improved/stable    Disposition:  discharged to home    Medications:   Tylenol for discomfort    Patient Instructions:   Current Discharge Medication List        CONTINUE these medications which have NOT CHANGED    Details   albuterol sulfate 90 mcg/actuation aebs Inhale into the lungs.      aspirin (ECOTRIN) 81 MG EC tablet Take 1 tablet (81 mg total) by mouth once daily. Start after 12 weeks gestation.  Qty: 30 tablet, Refills: 10    Associated Diagnoses: Pre-existing essential hypertension complicating pregnancy in second trimester; Hx of preeclampsia, prior pregnancy, currently pregnant; Prior pregnancy complicated by IUGR, antepartum; At high risk for complications of intrauterine pregnancy (IUP)      buPROPion (WELLBUTRIN XL) 150 MG TB24 tablet Take 150 mg by mouth.      butalbital-acetaminophen-caffeine -40 mg (FIORICET, ESGIC) -40 mg per tablet Take 1 tablet by mouth every 4 (four) hours as needed for Pain.  Qty: 30 tablet, Refills: 1      labetaloL (NORMODYNE) 200 MG tablet Take 200 mg by mouth 3 (three) times daily.      ondansetron (ZOFRAN-ODT) 4 MG TbDL Take 1 tablet (4 mg total) by mouth every 6 (six) hours as needed (nausea).  Qty: 10 tablet, Refills: 0      prenatal vit no.124-iron-folic 27 mg iron- 800 mcg Tab Take by mouth.             - Pt was given routine pregnancy instructions including to return to triage if she had any vaginal bleeding (other than spotting for the next 48hrs), any loss of fluid like her water broke, decreased fetal movement, or contractions Q 5min lasting for 2 or more hours. Pt was also instructed to drink copious water. Patient voiced understanding of all these instructions and was subsequently discharged home.    She will follow up with her primary OB.    No discharge procedures on file.    Marcial Cruz MD  OB-GYN Hospitalist       Marcial Cruz MD  05/07/24 3318

## 2024-05-08 ENCOUNTER — OFFICE VISIT (OUTPATIENT)
Dept: MATERNAL FETAL MEDICINE | Facility: CLINIC | Age: 24
End: 2024-05-08
Payer: MEDICAID

## 2024-05-08 ENCOUNTER — PROCEDURE VISIT (OUTPATIENT)
Dept: MATERNAL FETAL MEDICINE | Facility: CLINIC | Age: 24
End: 2024-05-08
Payer: MEDICAID

## 2024-05-08 VITALS
HEIGHT: 66 IN | WEIGHT: 260 LBS | SYSTOLIC BLOOD PRESSURE: 122 MMHG | HEART RATE: 85 BPM | DIASTOLIC BLOOD PRESSURE: 83 MMHG | BODY MASS INDEX: 41.78 KG/M2

## 2024-05-08 DIAGNOSIS — O09.299 HX OF PREECLAMPSIA, PRIOR PREGNANCY, CURRENTLY PREGNANT: ICD-10-CM

## 2024-05-08 DIAGNOSIS — O10.012 PRE-EXISTING ESSENTIAL HYPERTENSION COMPLICATING PREGNANCY IN SECOND TRIMESTER: ICD-10-CM

## 2024-05-08 DIAGNOSIS — O09.299 PRIOR PREGNANCY COMPLICATED BY IUGR, ANTEPARTUM: ICD-10-CM

## 2024-05-08 DIAGNOSIS — O09.90 AT HIGH RISK FOR COMPLICATIONS OF INTRAUTERINE PREGNANCY (IUP): ICD-10-CM

## 2024-05-08 DIAGNOSIS — Z03.75 SUSPECTED CERVICAL SHORTENING NOT FOUND: ICD-10-CM

## 2024-05-08 DIAGNOSIS — O09.43 GRAND MULTIPARITY WITH CURRENT PREGNANCY IN THIRD TRIMESTER: Primary | ICD-10-CM

## 2024-05-08 DIAGNOSIS — O10.013 PRE-EXISTING ESSENTIAL HYPERTENSION COMPLICATING PREGNANCY IN THIRD TRIMESTER: ICD-10-CM

## 2024-05-08 PROBLEM — O09.212 SUPERVISION OF PREGNANCY WITH HISTORY OF PRE-TERM LABOR IN SECOND TRIMESTER: Status: RESOLVED | Noted: 2024-03-18 | Resolved: 2024-05-08

## 2024-05-08 PROCEDURE — 99213 OFFICE O/P EST LOW 20 MIN: CPT | Mod: TH,S$GLB,, | Performed by: OBSTETRICS & GYNECOLOGY

## 2024-05-08 PROCEDURE — 3008F BODY MASS INDEX DOCD: CPT | Mod: CPTII,S$GLB,, | Performed by: OBSTETRICS & GYNECOLOGY

## 2024-05-08 PROCEDURE — 3074F SYST BP LT 130 MM HG: CPT | Mod: CPTII,S$GLB,, | Performed by: OBSTETRICS & GYNECOLOGY

## 2024-05-08 PROCEDURE — 76816 OB US FOLLOW-UP PER FETUS: CPT | Mod: S$GLB,,, | Performed by: OBSTETRICS & GYNECOLOGY

## 2024-05-08 PROCEDURE — 1160F RVW MEDS BY RX/DR IN RCRD: CPT | Mod: CPTII,S$GLB,, | Performed by: OBSTETRICS & GYNECOLOGY

## 2024-05-08 PROCEDURE — 3079F DIAST BP 80-89 MM HG: CPT | Mod: CPTII,S$GLB,, | Performed by: OBSTETRICS & GYNECOLOGY

## 2024-05-08 PROCEDURE — 1159F MED LIST DOCD IN RCRD: CPT | Mod: CPTII,S$GLB,, | Performed by: OBSTETRICS & GYNECOLOGY

## 2024-05-08 NOTE — PROGRESS NOTES
Maternal Fetal Medicine Follow Up    Subjective:     Patient ID: [id number]    Chief Complaint: MFM follow up with US (Patient is having pelvic pressure and back pain.)      HPI: Annie Anderson is a 23 y.o. female  at 28w6d gestation with Estimated Date of Delivery: 24  who is here for follow-up consultation by MFM.    She has chronic hypertension diagnosed after 2019 pregnancy and is currently on labetalol 200 mg q.8 hours.  She is on low-dose aspirin daily.  She has history of 2 indicated  deliveries, 1st at at 26 weeks in her 2nd pregnancy and 2nd at 25 weeks in her 4th pregnancy. In her 2019 pregnancy, she had eclampsia requiring delivery at 26 weeks.  She delivered at [facility], records reviewed.  In her  pregnancy, she had indicated  delivery at 25 weeks due to preeclampsia, fetal growth restriction, and oligohydramnios.  Reviewed op report and  delivery was a classical  section.  She has had 3 total C-sections, at least 1 of which was a classical .  This is a short interval pregnancy, with her last delivery in 2023 that was a classical .  She has increased BMI of 41 at initial OB visit.  She is on low-dose aspirin daily.       Interval history since last MFM visit: None.. She denies any leaking fluid, vaginal bleeding, contractions, decreased fetal movement. Denies headaches, visual disturbances, or epigastric pain.    Pregnancy complications include:   Patient Active Problem List   Diagnosis    Pre-existing essential hypertension complicating pregnancy in third trimester    Hx of preeclampsia, prior pregnancy, currently pregnant    Prior pregnancy complicated by IUGR, antepartum    At high risk for complications of intrauterine pregnancy (IUP)    Grand multiparity with current pregnancy in third trimester       No changes to medical, surgical, family, social, or obstetric history.    Medications:  Current Outpatient  "Medications   Medication Instructions    albuterol sulfate 90 mcg/actuation aebs Inhalation    aspirin (ECOTRIN) 81 mg, Oral, Daily, Start after 12 weeks gestation.    buPROPion (WELLBUTRIN XL) 150 mg    butalbital-acetaminophen-caffeine -40 mg (FIORICET, ESGIC) -40 mg per tablet 1 tablet, Oral, Every 4 hours PRN    labetaloL (NORMODYNE) 200 mg, Oral, 3 times daily    ondansetron (ZOFRAN-ODT) 4 mg, Oral, Every 6 hours PRN    prenatal vit no.124-iron-folic 27 mg iron- 800 mcg Tab Oral       Review of Systems   12 point review of systems conducted, negative except as stated in the history of present illness. See HPI for details.      Objective:     Visit Vitals  /83 (BP Location: Right arm, Patient Position: Sitting, BP Method: Large (Automatic))   Pulse 85   Ht 5' 6" (1.676 m)   Wt 117.9 kg (260 lb)   LMP 10/19/2023 (Exact Date)   BMI 41.97 kg/m²        Physical Exam  Vitals and nursing note reviewed.   Constitutional:       Appearance: Normal appearance.      Comments: Increased BMI   HENT:      Head: Normocephalic and atraumatic.      Nose: Nose normal. No congestion.   Cardiovascular:      Rate and Rhythm: Normal rate.   Pulmonary:      Effort: Pulmonary effort is normal.   Skin:     Findings: No rash.   Neurological:      Mental Status: She is alert and oriented to person, place, and time.   Psychiatric:         Mood and Affect: Mood normal.         Behavior: Behavior normal.         Thought Content: Thought content normal.         Judgment: Judgment normal.         Assessment/Plan:     23 y.o.  female with IUP at 28w6d    Chronic hypertension in pregnancy  There is normal fetal growth with an EFW of 1250 g at the 37% and the AC at the 35% on 2024.  AFV is normal.     Chronic hypertension complicates up to 2-5% of pregnancies and is associated with significant adverse pregnancy outcomes including  birth, fetal growth restriction, fetal demise, placental abruption, and  " delivery.    BP Readings from Last 1 Encounters:   24 122/83     With this BP, and another reading at 130/90, she was advised to continue low salt diet, and continue labetalol at 200 mg q.8h .     Low dose aspirin as discussed.    She would benefit from follow-up ultrasounds to monitor growth every 4 weeks until the end of pregnancy. Recommend fetal testing starting around 32 weeks gestation until the end of pregnancy.    Delivery as below with history of classical .      History of fetal growth restriction in 3 previous pregnancies  Discussed potential risk of recurrence of growth restriction in subsequent pregnancies. There is normal fetal growth today.  We will plan to recheck fetal growth in about 4 weeks. She is to do fetal kick counts 3x/daily with immediate reporting of decreased fetal movements (<10 movements/hr).         History of severe preeclampsia and fetal growth restriction requiring delivery at 25 weeks in previous pregnancy  With increased risk for recurrence, it was agreed to continue asa 81 mg daily until delivery.. Preeclampsia precautions reviewed.       History of 3 previous C-sections (1 classical )  Patient is aware of the risk of uterine rupture around 5% with labor and need to avoid that close to term. Reviewed instructions to come in immediately, if increased cramps (more than 5 per hour), increased vaginal discharge and or spotting that are not resolving with hydration and rest.      With chronic hypertension on medication, history of fetal growth restriction and severe preeclampsia requiring delivery at 25 weeks via classical , and short interval pregnancy, recommend delivery at 36 weeks' gestation (36-36 6/7th weeks) as optimal balance between prematurity risks and risks of continued pregnancy. Earlier delivery may be needed for worsening maternal or fetal status.  Recommend administering a course of steroids prior to delivery.      Short interval  pregnancy  A short interpregnancy interval is defined as an interval between the delivery of one pregnancy and the conception of a subsequent pregnancy. Most adverse pregnancy outcomes are associated with an IPI of < 6 months. A short IPI is associated with increased risk of maternal anemia, PPROM/PTB, delivery of a SGA infant, and placental abruption. Additionally, the risk of recurrent preeclampsia is higher with an IPI of < 12 months. Maternal anemia due to iron deficiency should be corrected. Additionally, the risk of uterine rupture is higher in women with a short interdelivery interval (< 18 months- birth to birth interval). Additionally,risk of TOLAC failure is higher and therefore, women should be counseled accordingly. FKC and  labor instructions given.       Increased BMI in pregnancy  Body mass index is 41.97 kg/m². With reasonable weight gain since last visit, she was advised to continue healthy low caloric and low salt diet. Excess weight gain would be associated with gestational hypertension, gestational diabetes and adverse  outcomes, including fetal demise in utero.    With elevated BMI, will do fetal testing as recommended elsewhere in this note. She was advised to continue fetal kick counts 3 times daily, with immediate reporting of decreased fetal movements (<10 movements/hr).    Preoperative antibiotics and thromboprophylaxis with use of pneumatic compression devices is recommend in those with very elevated BMI undergoing  delivery. Thromboprophylaxis should also be used with those on prolonged immobilization.    It is important to lose weight after the pregnancy is over, especially before a future pregnancy was discussed. Breastfeeding may be an important tool in reducing the postpartum weight retention. Fetal risks were discussed with short term risk of fetal/ obesity and long term risk of adolescent component of metabolic syndrome.       Grand multiparity  She  was advised of the increased risk of malpresentation, abnormal labors, and risk of postpartum hemorrhage and expectant management needs to be done. Consider using prophylactic dose of uterotonic agents after delivery to reduce the risk of postpartum hemorrhage.    Recommend flagging the chart on admission as high risk for postpartum hemorrhage to have blood type and screened and be prepared in the event that that happens.     Has not done 1 hour GCT and ordered test today.    Follow up in about 3 weeks (around 5/29/2024) for MFM follow-up, BPP, Repeat  ultrasound, Thursday.     No future appointments.     Patient was evaluated and examined by Dr. Minaya. DOTTIE Hartman, helped in pre charting of part of note.     This note was created with the assistance of [employer] voice recognition software. There may be transcription errors as a result of using this technology, however minimal. Effort has been made to ensure accuracy of transcription, but any obvious errors or omissions should be clarified with the author of the document.

## 2024-05-28 DIAGNOSIS — O10.013 PRE-EXISTING ESSENTIAL HYPERTENSION COMPLICATING PREGNANCY IN THIRD TRIMESTER: Primary | ICD-10-CM

## 2024-05-29 NOTE — PROGRESS NOTES
Maternal Fetal Medicine Follow Up    Subjective:     Patient ID: [id number]    Chief Complaint: MFM follow up with US      HPI: Annie Anderson is a 23 y.o. female  at 32w0d gestation with Estimated Date of Delivery: 24  who is here for follow-up consultation by MFM.    She has chronic hypertension diagnosed after 2019 pregnancy and is currently prescribed labetalol 200 mg q.8 hours.  However, she reports she has not been taking it for the last month.  She is also supposed to be on low-dose aspirin daily but reports has not taken it for the last month.  She has history of 2 indicated  deliveries, 1st at at 26 weeks in her 2nd pregnancy and 2nd at 25 weeks in her 4th pregnancy. In her 2019 pregnancy, she had eclampsia requiring delivery at 26 weeks.  She delivered at [facility], records reviewed.  In her  pregnancy, she had indicated  delivery at 25 weeks due to preeclampsia, fetal growth restriction, and oligohydramnios.  Reviewed op report and  delivery was a classical  section.  She has had 3 total C-sections, at least 1 of which was a classical .  This is a short interval pregnancy, with her last delivery in 2023 that was a classical .  She has increased BMI of 41 at initial OB visit.        Interval history since last MFM visit: None.. She denies any leaking fluid, vaginal bleeding, contractions, decreased fetal movement. Denies headaches, visual disturbances, or epigastric pain.    Pregnancy complications include:   Patient Active Problem List   Diagnosis    Pre-existing essential hypertension complicating pregnancy in third trimester    Hx of preeclampsia, prior pregnancy, currently pregnant    Prior pregnancy complicated by IUGR, antepartum    At high risk for complications of intrauterine pregnancy (IUP)    Grand multiparity with current pregnancy in third trimester       No changes to medical, surgical, family, social,  "or obstetric history.    Medications:  Current Outpatient Medications   Medication Instructions    albuterol sulfate 90 mcg/actuation aebs Inhalation    aspirin (ECOTRIN) 81 mg, Oral, Daily, Start after 12 weeks gestation.    labetaloL (NORMODYNE) 200 mg, Oral, 3 times daily    prenatal vit no.124-iron-folic 27 mg iron- 800 mcg Tab Oral       Review of Systems   12 point review of systems conducted, negative except as stated in the history of present illness. See HPI for details.      Objective:     Visit Vitals  /80 (BP Location: Left arm, Patient Position: Sitting, BP Method: Large (Automatic))   Pulse 103   Ht 5' 6" (1.676 m)   Wt 115.2 kg (254 lb)   LMP 10/19/2023 (Exact Date)   BMI 41.00 kg/m²        Physical Exam  Vitals and nursing note reviewed.   Constitutional:       Appearance: Normal appearance.      Comments: Increased BMI   HENT:      Head: Normocephalic and atraumatic.      Nose: Nose normal. No congestion.   Cardiovascular:      Rate and Rhythm: Normal rate.   Pulmonary:      Effort: Pulmonary effort is normal.   Skin:     Findings: No rash.   Neurological:      Mental Status: She is alert and oriented to person, place, and time.   Psychiatric:         Mood and Affect: Mood normal.         Behavior: Behavior normal.         Thought Content: Thought content normal.         Judgment: Judgment normal.         Assessment/Plan:     23 y.o.  female with IUP at 32w0d    Chronic hypertension in pregnancy  There is normal fetal growth with an EFW of 1724 g at the 19% and the AC at the 28% on 2024.  AFV is normal. BPP is 8/8 today (2024).     Chronic hypertension complicates up to 2-5% of pregnancies and is associated with significant adverse pregnancy outcomes including  birth, fetal growth restriction, fetal demise, placental abruption, and  delivery.    BP Readings from Last 1 Encounters:   24 124/80     With this BP, and patient off of labetalol for the last " month, she was advised to follow a low-sodium diet and will hold off on medication this time.  She was advised to check her blood pressure 2 to 3 times a day at home and keep a log of readings to bring to next visit.  Advised her to notify us if blood pressure is persistently over 140/90.  Advised her to go to the hospital for evaluation if she has any blood pressure readings over 160/105.  We will re-evaluate at next visit to determine whether we need to resume labetalol.  Preeclampsia precautions reviewed.    Low dose aspirin as discussed.    She would benefit from follow-up ultrasounds to monitor growth every 4 weeks until the end of pregnancy.  Fetal testing could be done weekly at this time with patient off of medication.  However, if we need to resume labetalol, recommend fetal testing twice weekly until the end of pregnancy.    Delivery as below with history of classical .      History of fetal growth restriction in 3 previous pregnancies  Discussed potential risk of recurrence of growth restriction in subsequent pregnancies. There is normal fetal growth today. She is to do fetal kick counts 3x/daily with immediate reporting of decreased fetal movements (<10 movements/hr).         History of severe preeclampsia and fetal growth restriction requiring delivery at 25 weeks in previous pregnancy  With increased risk for recurrence, she was previously recommended to take low-dose aspirin but has not taken it for the last 4 weeks.  Too late to restart at this time.  Preeclampsia precautions reviewed.       History of 3 previous C-sections (1 classical )  Patient is aware of the risk of uterine rupture around 5% with labor and need to avoid that close to term. Reviewed instructions to come in immediately, if increased cramps (more than 5 per hour), increased vaginal discharge and or spotting that are not resolving with hydration and rest.      With chronic hypertension on medication, history of  fetal growth restriction and severe preeclampsia requiring delivery at 25 weeks via classical , and short interval pregnancy, recommend delivery at 36 weeks' gestation (36-36 6/7th weeks) as optimal balance between prematurity risks and risks of continued pregnancy. Earlier delivery may be needed for worsening maternal or fetal status.  Recommend administering a course of steroids prior to delivery.      Short interval pregnancy  A short interpregnancy interval is defined as an interval between the delivery of one pregnancy and the conception of a subsequent pregnancy. Most adverse pregnancy outcomes are associated with an IPI of < 6 months. A short IPI is associated with increased risk of maternal anemia, PPROM/PTB, delivery of a SGA infant, and placental abruption. Additionally, the risk of recurrent preeclampsia is higher with an IPI of < 12 months. Maternal anemia due to iron deficiency should be corrected. Additionally, the risk of uterine rupture is higher in women with a short interdelivery interval (< 18 months- birth to birth interval). Additionally,risk of TOLAC failure is higher and therefore, women should be counseled accordingly. FKC and  labor instructions given.       Increased BMI in pregnancy  Body mass index is 41 kg/m². With fluctuation in weight but overall stable over the last 2 months, she was advised to continue healthy low caloric and low salt diet. Excess weight gain would be associated with gestational hypertension, gestational diabetes and adverse  outcomes, including fetal demise in utero.    With elevated BMI, will do fetal testing as recommended elsewhere in this note. She was advised to continue fetal kick counts 3 times daily, with immediate reporting of decreased fetal movements (<10 movements/hr).    Preoperative antibiotics and thromboprophylaxis with use of pneumatic compression devices is recommend in those with very elevated BMI undergoing   delivery. Thromboprophylaxis should also be used with those on prolonged immobilization.    It is important to lose weight after the pregnancy is over, especially before a future pregnancy was discussed. Breastfeeding may be an important tool in reducing the postpartum weight retention. Fetal risks were discussed with short term risk of fetal/ obesity and long term risk of adolescent component of metabolic syndrome.       Grand multiparity  She was advised of the increased risk of malpresentation, abnormal labors, and risk of postpartum hemorrhage and expectant management needs to be done. Consider using prophylactic dose of uterotonic agents after delivery to reduce the risk of postpartum hemorrhage.    Recommend flagging the chart on admission as high risk for postpartum hemorrhage to have blood type and screened and be prepared in the event that that happens.     Follow up in about 1 week (around 2024) for [facility] follow-up, BPP.     Future Appointments   Date Time Provider Department Center   6/3/2024 11:00 AM ROOM 3, [facility]   6/3/2024 11:15 AM Carie Seymour WHNP [facility]     Dalila Hamilton PA-C     This note was created with the assistance of [email or web address] voice recognition software. There may be transcription errors as a result of using this technology, however minimal. Effort has been made to ensure accuracy of transcription, but any obvious errors or omissions should be clarified with the author of the document.

## 2024-05-30 ENCOUNTER — OFFICE VISIT (OUTPATIENT)
Dept: MATERNAL FETAL MEDICINE | Facility: CLINIC | Age: 24
End: 2024-05-30
Payer: MEDICAID

## 2024-05-30 ENCOUNTER — PROCEDURE VISIT (OUTPATIENT)
Dept: MATERNAL FETAL MEDICINE | Facility: CLINIC | Age: 24
End: 2024-05-30
Payer: MEDICAID

## 2024-05-30 VITALS
BODY MASS INDEX: 40.82 KG/M2 | HEART RATE: 103 BPM | HEIGHT: 66 IN | WEIGHT: 254 LBS | SYSTOLIC BLOOD PRESSURE: 124 MMHG | DIASTOLIC BLOOD PRESSURE: 80 MMHG

## 2024-05-30 DIAGNOSIS — O09.43 GRAND MULTIPARITY WITH CURRENT PREGNANCY IN THIRD TRIMESTER: ICD-10-CM

## 2024-05-30 DIAGNOSIS — O10.013 PRE-EXISTING ESSENTIAL HYPERTENSION COMPLICATING PREGNANCY IN THIRD TRIMESTER: ICD-10-CM

## 2024-05-30 DIAGNOSIS — O09.299 PRIOR PREGNANCY COMPLICATED BY IUGR, ANTEPARTUM: ICD-10-CM

## 2024-05-30 DIAGNOSIS — O09.299 HX OF PREECLAMPSIA, PRIOR PREGNANCY, CURRENTLY PREGNANT: ICD-10-CM

## 2024-05-30 DIAGNOSIS — O09.90 AT HIGH RISK FOR COMPLICATIONS OF INTRAUTERINE PREGNANCY (IUP): Primary | ICD-10-CM

## 2024-05-30 PROCEDURE — 3074F SYST BP LT 130 MM HG: CPT | Mod: CPTII,S$GLB,,

## 2024-05-30 PROCEDURE — 76816 OB US FOLLOW-UP PER FETUS: CPT | Mod: S$GLB,,, | Performed by: OBSTETRICS & GYNECOLOGY

## 2024-05-30 PROCEDURE — 99213 OFFICE O/P EST LOW 20 MIN: CPT | Mod: TH,S$GLB,,

## 2024-05-30 PROCEDURE — 1160F RVW MEDS BY RX/DR IN RCRD: CPT | Mod: CPTII,S$GLB,,

## 2024-05-30 PROCEDURE — 3079F DIAST BP 80-89 MM HG: CPT | Mod: CPTII,S$GLB,,

## 2024-05-30 PROCEDURE — 76819 FETAL BIOPHYS PROFIL W/O NST: CPT | Mod: S$GLB,,, | Performed by: OBSTETRICS & GYNECOLOGY

## 2024-05-30 PROCEDURE — 3008F BODY MASS INDEX DOCD: CPT | Mod: CPTII,S$GLB,,

## 2024-05-30 PROCEDURE — 1159F MED LIST DOCD IN RCRD: CPT | Mod: CPTII,S$GLB,,

## 2024-06-03 DIAGNOSIS — O10.013 PRE-EXISTING ESSENTIAL HYPERTENSION COMPLICATING PREGNANCY IN THIRD TRIMESTER: ICD-10-CM

## 2024-06-03 DIAGNOSIS — O09.90 AT HIGH RISK FOR COMPLICATIONS OF INTRAUTERINE PREGNANCY (IUP): Primary | ICD-10-CM

## 2024-06-07 NOTE — PROGRESS NOTES
Maternal Fetal Medicine Follow Up    Subjective:     Patient ID: [id number]    Chief Complaint: high risk pregnancy followup    HPI: Annie Anderson is a 23 y.o. female  at 33w4d gestation with Estimated Date of Delivery: 24  who is here for follow-up consultation by MFM.    She has chronic hypertension diagnosed after 2019 pregnancy. She was previously on labetalol and low dose aspirin, but on  had been reporting off meds the previous month. She remains off of both medications at this time as it was too late to restart the low dose asa and BP was in normal range off medication. She has history of 2 indicated  deliveries, one at 26 weeks in her 2nd pregnancy and the other at 25 weeks in her 4th pregnancy. In her 2019 pregnancy, she had eclampsia requiring delivery at 26 weeks. In her  pregnancy, she had indicated  delivery at 25 weeks due to preeclampsia, fetal growth restriction, and oligohydramnios.  Reviewed op report and  delivery was a classical  section.  She has had 3 total C-sections, at least 1 of which was a classical .  This is a short interval pregnancy, with her last delivery in 2023 that was a classical .  She had increased BMI of 41 at initial OB visit.        Interval history since last MFM visit: None.. She denies any leaking fluid, vaginal bleeding, contractions, decreased fetal movement. Denies headaches, visual disturbances, or epigastric pain.    Pregnancy complications include:   Patient Active Problem List   Diagnosis    Pre-existing essential hypertension complicating pregnancy in third trimester    Hx of preeclampsia, prior pregnancy, currently pregnant    Prior pregnancy complicated by IUGR, antepartum    At high risk for complications of intrauterine pregnancy (IUP)    Grand multiparity with current pregnancy in third trimester       No changes to medical, surgical, family, social, or obstetric  "history.    Medications:  Current Outpatient Medications   Medication Instructions    albuterol sulfate 90 mcg/actuation aebs Inhalation    aspirin (ECOTRIN) 81 mg, Oral, Daily, Start after 12 weeks gestation.    labetaloL (NORMODYNE) 200 mg, 3 times daily    prenatal vit no.124-iron-folic 27 mg iron- 800 mcg Tab Oral       Review of Systems   12 point review of systems conducted, negative except as stated in the history of present illness. See HPI for details.      Objective:     Visit Vitals  /83 (BP Location: Right arm, Patient Position: Sitting, BP Method: Large (Automatic))   Pulse 98   Ht 5' 6" (1.676 m)   Wt 115.7 kg (255 lb)   LMP 10/19/2023 (Exact Date)   BMI 41.16 kg/m²        Physical Exam  Vitals and nursing note reviewed.   Constitutional:       Appearance: Normal appearance.   HENT:      Head: Normocephalic and atraumatic.   Cardiovascular:      Rate and Rhythm: Normal rate and regular rhythm.   Pulmonary:      Effort: Pulmonary effort is normal. No respiratory distress.      Breath sounds: Normal breath sounds.   Abdominal:      Palpations: Abdomen is soft.      Tenderness: There is no abdominal tenderness.   Musculoskeletal:      Right lower leg: No edema.      Left lower leg: No edema.   Neurological:      Mental Status: She is alert and oriented to person, place, and time.   Psychiatric:         Mood and Affect: Mood normal.         Behavior: Behavior normal.         Thought Content: Thought content normal.         Judgment: Judgment normal.         Assessment/Plan:     23 y.o.  female with IUP at 33w4d    Chronic hypertension in pregnancy  There was normal fetal growth with an EFW of 1724 g at the 19% and the AC at the 28% on 2024.  AFV is normal. BPP is 8/8 today (06/10/2024).     Based on findings of recent CHAP Study, it is recommended to utilize 140/90 as the threshold for initiation or titration of medical therapy for chronic hypertension in pregnancy, rather than the " previously recommended threshold of 160/110. Commonly used medications include nifedipine and labetalol.    BP Readings from Last 1 Encounters:   06/10/24 123/83   Initial BP: 119/90.     With this BP, she was advised to follow a low-sodium diet and will continue to stay off on medication this time.      She was advised to continue to check the BP at home. Reviewed reportable ranges.     Low dose aspirin as discussed. Preeclampsia precautions reviewed.    Continue follow-up ultrasounds to monitor growth every 4 weeks until the end of pregnancy.     With CHTN, poor OB history, and short interval pregnancy, recommend fetal testing twice weekly until the end of pregnancy, alternating with Primary OB  Urged to keep followup as planned, especially with risks of co morbidities as listed, including risk of FDIU.    Delivery as below with history of classical .      History of fetal growth restriction in 3 previous pregnancies  Will continue to assess fetal growth as above. Increased surveillance to be done if any evidence of slowing fetal growth.       History of severe preeclampsia and fetal growth restriction requiring delivery at 25 weeks in previous pregnancy  She was initially on low dose asa, but self discontinued and it was too late at notification to restart. Preeclampsia precautions reviewed.       History of 3 previous C-sections (1 classical )  Patient is aware of the risk of uterine rupture around 5% with labor and need to avoid that close to term. Reviewed instructions to come in immediately, if increased cramps (more than 5 per hour), increased vaginal discharge and or spotting that are not resolving with hydration and rest.      With chronic hypertension on medication, history of fetal growth restriction and severe preeclampsia requiring delivery at 25 weeks via classical , and short interval pregnancy, recommend delivery at 36 weeks' gestation (36-36 6/7th weeks) as optimal  balance between prematurity risks and risks of continued pregnancy. Earlier delivery may be needed for worsening maternal or fetal status.  Recommend administering a course of steroids prior to delivery.      Short interval pregnancy  A short IPI is associated with increased risk of maternal anemia, PPROM/PTB, delivery of a SGA infant, and placental abruption. Additionally, the risk of recurrent preeclampsia is higher with an IPI of < 12 months.   Additionally, the risk of uterine rupture is higher in women with a short interdelivery interval (< 18 months- birth to birth interval), especially for her with previous classical  section. Reviewed PTL precautions.       Increased BMI in pregnancy  Body mass index is 41.16 kg/m². With relatively stable weight since last visit, she was advised to continue healthy low caloric and low salt diet.  Excess weight gain would be associated with worsening hypertension, gestational diabetes and adverse  outcomes, including fetal demise in utero.    Reviewed importance of FKC 3/day and prn with instructions to immediately report any decreased fetal movement.    It is important to lose weight after the pregnancy is over, especially before a future pregnancy. Breastfeeding may be an important tool in reducing the postpartum weight retention. Fetal risks were discussed with short term risk of fetal/ obesity and long term risk of adolescent component of metabolic syndrome.    Preoperative antibiotics and thromboprophylaxis with use of pneumatic compression devices is recommend in those with very elevated BMI undergoing  delivery. Thromboprophylaxis should also be used with those on prolonged immobilization.      Grand multiparity  Consider using prophylactic dose of uterotonic agents after delivery to reduce the risk of postpartum hemorrhage.    Recommend flagging the chart on admission as high risk for postpartum hemorrhage to have blood type and  screened and be prepared in the event that that happens.       We will continue low-salt diet for now with no antihypertensive medication.  We will do twice weekly fetal testing.  Recommend a course of steroids few days before delivery with plan delivery somewhere around 36-,1/2 weeks gestation with a combination of chronic hypertension 3 previous  sections and a classical  section before.  Discussed with Dr. Day.    Follow up in about 1 week (around 2024) for MFM follow-up, BPP.     No future appointments.    Patient was evaluated by DOTTIE Hartman and Dr. Minaya.  Final assessment and recommendations as stated above were made by Dr. Minaya.    This note was created with the assistance of [employer] voice recognition software. There may be transcription errors as a result of using this technology, however minimal. Effort has been made to ensure accuracy of transcription, but any obvious errors or omissions should be clarified with the author of the document.

## 2024-06-10 ENCOUNTER — PROCEDURE VISIT (OUTPATIENT)
Dept: MATERNAL FETAL MEDICINE | Facility: CLINIC | Age: 24
End: 2024-06-10
Payer: MEDICAID

## 2024-06-10 ENCOUNTER — OFFICE VISIT (OUTPATIENT)
Dept: MATERNAL FETAL MEDICINE | Facility: CLINIC | Age: 24
End: 2024-06-10
Payer: MEDICAID

## 2024-06-10 VITALS
HEART RATE: 98 BPM | HEIGHT: 66 IN | WEIGHT: 255 LBS | SYSTOLIC BLOOD PRESSURE: 123 MMHG | DIASTOLIC BLOOD PRESSURE: 83 MMHG | BODY MASS INDEX: 40.98 KG/M2

## 2024-06-10 DIAGNOSIS — O10.013 PRE-EXISTING ESSENTIAL HYPERTENSION COMPLICATING PREGNANCY IN THIRD TRIMESTER: ICD-10-CM

## 2024-06-10 DIAGNOSIS — O09.90 AT HIGH RISK FOR COMPLICATIONS OF INTRAUTERINE PREGNANCY (IUP): Primary | ICD-10-CM

## 2024-06-10 DIAGNOSIS — O09.299 HX OF PREECLAMPSIA, PRIOR PREGNANCY, CURRENTLY PREGNANT: ICD-10-CM

## 2024-06-10 DIAGNOSIS — O09.90 AT HIGH RISK FOR COMPLICATIONS OF INTRAUTERINE PREGNANCY (IUP): ICD-10-CM

## 2024-06-10 DIAGNOSIS — O99.213 OBESITY AFFECTING PREGNANCY IN THIRD TRIMESTER, UNSPECIFIED OBESITY TYPE: ICD-10-CM

## 2024-06-10 DIAGNOSIS — O09.43 GRAND MULTIPARITY WITH CURRENT PREGNANCY IN THIRD TRIMESTER: ICD-10-CM

## 2024-06-10 DIAGNOSIS — O09.299 PRIOR PREGNANCY COMPLICATED BY IUGR, ANTEPARTUM: ICD-10-CM

## 2024-06-10 PROCEDURE — 1160F RVW MEDS BY RX/DR IN RCRD: CPT | Mod: CPTII,S$GLB,, | Performed by: OBSTETRICS & GYNECOLOGY

## 2024-06-10 PROCEDURE — 3079F DIAST BP 80-89 MM HG: CPT | Mod: CPTII,S$GLB,, | Performed by: OBSTETRICS & GYNECOLOGY

## 2024-06-10 PROCEDURE — 1159F MED LIST DOCD IN RCRD: CPT | Mod: CPTII,S$GLB,, | Performed by: OBSTETRICS & GYNECOLOGY

## 2024-06-10 PROCEDURE — 3008F BODY MASS INDEX DOCD: CPT | Mod: CPTII,S$GLB,, | Performed by: OBSTETRICS & GYNECOLOGY

## 2024-06-10 PROCEDURE — 99213 OFFICE O/P EST LOW 20 MIN: CPT | Mod: TH,S$GLB,, | Performed by: OBSTETRICS & GYNECOLOGY

## 2024-06-10 PROCEDURE — 3074F SYST BP LT 130 MM HG: CPT | Mod: CPTII,S$GLB,, | Performed by: OBSTETRICS & GYNECOLOGY

## 2024-06-10 PROCEDURE — 76819 FETAL BIOPHYS PROFIL W/O NST: CPT | Mod: S$GLB,,, | Performed by: OBSTETRICS & GYNECOLOGY

## 2024-06-13 DIAGNOSIS — O99.213 OBESITY AFFECTING PREGNANCY IN THIRD TRIMESTER, UNSPECIFIED OBESITY TYPE: ICD-10-CM

## 2024-06-13 DIAGNOSIS — O10.013 PRE-EXISTING ESSENTIAL HYPERTENSION COMPLICATING PREGNANCY IN THIRD TRIMESTER: Primary | ICD-10-CM

## 2024-06-13 DIAGNOSIS — O09.299 HX OF PREECLAMPSIA, PRIOR PREGNANCY, CURRENTLY PREGNANT: ICD-10-CM

## 2024-06-17 ENCOUNTER — OFFICE VISIT (OUTPATIENT)
Dept: MATERNAL FETAL MEDICINE | Facility: CLINIC | Age: 24
End: 2024-06-17
Payer: MEDICAID

## 2024-06-17 ENCOUNTER — PROCEDURE VISIT (OUTPATIENT)
Dept: MATERNAL FETAL MEDICINE | Facility: CLINIC | Age: 24
End: 2024-06-17
Payer: MEDICAID

## 2024-06-17 VITALS
BODY MASS INDEX: 41.46 KG/M2 | HEART RATE: 93 BPM | WEIGHT: 258 LBS | DIASTOLIC BLOOD PRESSURE: 86 MMHG | SYSTOLIC BLOOD PRESSURE: 124 MMHG | HEIGHT: 66 IN

## 2024-06-17 DIAGNOSIS — O09.90 AT HIGH RISK FOR COMPLICATIONS OF INTRAUTERINE PREGNANCY (IUP): Primary | ICD-10-CM

## 2024-06-17 DIAGNOSIS — O10.013 PRE-EXISTING ESSENTIAL HYPERTENSION COMPLICATING PREGNANCY IN THIRD TRIMESTER: ICD-10-CM

## 2024-06-17 DIAGNOSIS — O09.299 PRIOR PREGNANCY COMPLICATED BY IUGR, ANTEPARTUM: ICD-10-CM

## 2024-06-17 DIAGNOSIS — O09.299 HX OF PREECLAMPSIA, PRIOR PREGNANCY, CURRENTLY PREGNANT: ICD-10-CM

## 2024-06-17 DIAGNOSIS — O99.213 OBESITY AFFECTING PREGNANCY IN THIRD TRIMESTER, UNSPECIFIED OBESITY TYPE: ICD-10-CM

## 2024-06-17 DIAGNOSIS — O09.43 GRAND MULTIPARITY WITH CURRENT PREGNANCY IN THIRD TRIMESTER: ICD-10-CM

## 2024-06-17 PROCEDURE — 1159F MED LIST DOCD IN RCRD: CPT | Mod: CPTII,S$GLB,, | Performed by: OBSTETRICS & GYNECOLOGY

## 2024-06-17 PROCEDURE — 3079F DIAST BP 80-89 MM HG: CPT | Mod: CPTII,S$GLB,, | Performed by: OBSTETRICS & GYNECOLOGY

## 2024-06-17 PROCEDURE — 76819 FETAL BIOPHYS PROFIL W/O NST: CPT | Mod: S$GLB,,, | Performed by: OBSTETRICS & GYNECOLOGY

## 2024-06-17 PROCEDURE — 1160F RVW MEDS BY RX/DR IN RCRD: CPT | Mod: CPTII,S$GLB,, | Performed by: OBSTETRICS & GYNECOLOGY

## 2024-06-17 PROCEDURE — 3074F SYST BP LT 130 MM HG: CPT | Mod: CPTII,S$GLB,, | Performed by: OBSTETRICS & GYNECOLOGY

## 2024-06-17 PROCEDURE — 3008F BODY MASS INDEX DOCD: CPT | Mod: CPTII,S$GLB,, | Performed by: OBSTETRICS & GYNECOLOGY

## 2024-06-17 PROCEDURE — 99213 OFFICE O/P EST LOW 20 MIN: CPT | Mod: TH,S$GLB,, | Performed by: OBSTETRICS & GYNECOLOGY

## 2024-06-17 NOTE — PROGRESS NOTES
Maternal Fetal Medicine Follow Up    Subjective:     Patient ID: [id number]    Chief Complaint: high risk pregnancy followup    HPI: Annie Anderson is a 23 y.o. female  at 34w4d gestation with Estimated Date of Delivery: 24  who is here for follow-up consultation by MFM.    She has chronic hypertension diagnosed after 2019 pregnancy. She was previously on labetalol and low dose aspirin, but on  had been reporting off meds the previous month. She remains off of both medications at this time as it was too late to restart the low dose asa and BP was in normal range off medication. She has history of 2 indicated  deliveries, one at 26 weeks in her 2nd pregnancy and the other at 25 weeks in her 4th pregnancy. In her 2019 pregnancy, she had eclampsia requiring delivery at 26 weeks. In her  pregnancy, she had indicated  delivery at 25 weeks due to preeclampsia, fetal growth restriction, and oligohydramnios.  Reviewed op report and  delivery was a classical  section.  She has had 3 total C-sections, at least 1 of which was a classical .  This is a short interval pregnancy, with her last delivery in 2023 that was a classical .  She had increased BMI of 41 at initial OB visit.      Interval history since last MFM visit: None.. She denies any leaking fluid, vaginal bleeding, contractions, decreased fetal movement. Denies headaches, visual disturbances, or epigastric pain.    Pregnancy complications include:   Patient Active Problem List   Diagnosis    Pre-existing essential hypertension complicating pregnancy in third trimester    Hx of preeclampsia, prior pregnancy, currently pregnant    Prior pregnancy complicated by IUGR, antepartum    At high risk for complications of intrauterine pregnancy (IUP)    Grand multiparity with current pregnancy in third trimester    Increased BMI complicating pregnancy in third trimester       No changes to medical,  "surgical, family, social, or obstetric history.    Medications:  Current Outpatient Medications   Medication Instructions    albuterol sulfate 90 mcg/actuation aebs Inhale into the lungs.    aspirin (ECOTRIN) 81 mg, Oral, Daily, Start after 12 weeks gestation.    labetaloL (NORMODYNE) 200 mg, 3 times daily    prenatal vit no.124-iron-folic 27 mg iron- 800 mcg Tab Oral       Review of Systems   12 point review of systems conducted, negative except as stated in the history of present illness. See HPI for details.      Objective:     Visit Vitals  /86 (BP Location: Left arm, Patient Position: Sitting, BP Method: Large (Automatic))   Pulse 93   Ht 5' 6" (1.676 m)   Wt 117 kg (258 lb)   LMP 10/19/2023 (Exact Date)   BMI 41.64 kg/m²        Physical Exam  Vitals and nursing note reviewed.   Constitutional:       Appearance: Normal appearance.   HENT:      Head: Normocephalic and atraumatic.   Cardiovascular:      Rate and Rhythm: Normal rate and regular rhythm.   Pulmonary:      Effort: Pulmonary effort is normal. No respiratory distress.      Breath sounds: Normal breath sounds.   Abdominal:      Palpations: Abdomen is soft.      Tenderness: There is no abdominal tenderness.   Musculoskeletal:      Right lower leg: No edema.      Left lower leg: No edema.   Neurological:      Mental Status: She is alert and oriented to person, place, and time.   Psychiatric:         Mood and Affect: Mood normal.         Behavior: Behavior normal.         Thought Content: Thought content normal.         Judgment: Judgment normal.       Assessment/Plan:     23 y.o.  female with IUP at 34w4d    Chronic hypertension in pregnancy  There was normal fetal growth with an EFW of 1724 g at the 19% and the AC at the 28% on 2024.  AFV is normal. BPP is 8/8 today (2024).     Based on findings of recent CHAP Study, it is recommended to utilize 140/90 as the threshold for initiation or titration of medical therapy for chronic " hypertension in pregnancy, rather than the previously recommended threshold of 160/110. Commonly used medications include nifedipine and labetalol.    BP Readings from Last 1 Encounters:   24 124/86   With this BP, she was advised to follow a low-sodium diet and will continue to stay off on medication this time.      She was advised to continue to check the BP at home. Reviewed reportable ranges.     Low dose aspirin as discussed. Preeclampsia precautions reviewed.    Continue follow-up ultrasounds to monitor growth every 4 weeks until the end of pregnancy.     With CHTN, poor OB history, and short interval pregnancy, recommend fetal testing twice weekly until the end of pregnancy, alternating with Primary OB    Delivery as below with history of classical .      History of fetal growth restriction in 3 previous pregnancies  Will continue to assess fetal growth as above. Increased surveillance to be done if any evidence of slowing fetal growth.       History of severe preeclampsia and fetal growth restriction requiring delivery at 25 weeks in previous pregnancy  She was initially on low dose asa, but self discontinued and it was too late at notification to restart. Preeclampsia precautions reviewed.       History of 3 previous C-sections (1 classical )  Patient is aware of the risk of uterine rupture around 5% with labor and need to avoid that close to term. Reviewed instructions to come in immediately, if increased cramps (more than 5 per hour), increased vaginal discharge and or spotting that are not resolving with hydration and rest.      With chronic hypertension on medication, history of fetal growth restriction and severe preeclampsia requiring delivery at 25 weeks via classical , and short interval pregnancy, recommend delivery at 36 weeks' gestation (36-36 6/7th weeks) as optimal balance between prematurity risks and risks of continued pregnancy with a course of steroids to be  done within the week of delivery. Earlier delivery may be needed for worsening maternal or fetal status.        Short interval pregnancy  A short IPI is associated with increased risk of maternal anemia, PPROM/PTB, delivery of a SGA infant, and placental abruption. Additionally, the risk of recurrent preeclampsia is higher with an IPI of < 12 months.   Additionally, the risk of uterine rupture is higher in women with a short interdelivery interval (< 18 months- birth to birth interval), especially for her with previous classical  section. Reviewed PTL precautions.       Increased BMI in pregnancy  Body mass index is 41.64 kg/m². With 4 lb gain in about 3 weeks, she was advised to decrease caloric intake and was reminded of the importance of avoiding excessive weight gain.  Excess weight gain would be associated with worsening hypertension, gestational diabetes and adverse  outcomes, including fetal demise in utero.    Reviewed importance of FKC 3/day and prn with instructions to immediately report any decreased fetal movement.    It is important to lose weight after the pregnancy is over, especially before a future pregnancy. Breastfeeding may be an important tool in reducing the postpartum weight retention. Fetal risks were discussed with short term risk of fetal/ obesity and long term risk of adolescent component of metabolic syndrome.      Preoperative antibiotics and thromboprophylaxis with use of pneumatic compression devices is recommend in those with very elevated BMI undergoing  delivery. Thromboprophylaxis should also be used with those on prolonged immobilization.      Grand multiparity  Consider using prophylactic dose of uterotonic agents after delivery to reduce the risk of postpartum hemorrhage.    Recommend flagging the chart on admission as high risk for postpartum hemorrhage to have blood type and screened and be prepared in the event that that happens.     Blood  pressure is stable we will continue labetalol 200 mg q.8 hours.  Fetal kick count instructions.  Preeclampsia warnings.  Discussed with Dr. Day with the plan delivery around 36-1/2-37 weeks.  If delivery is to be done in the 36 week range, then recommend a course of steroids prior to delivery.    Follow up in about 1 week (around 6/24/2024) for MFM follow-up, BPP.     Future Appointments   Date Time Provider Department Center   6/24/2024  3:15 PM ROOM 3, [facility]   6/24/2024  3:30 PM Gael Minaya MD [facility]   7/1/2024  3:15 PM ROOM 3, [facility]   7/1/2024  3:30 PM Gael Minaya MD [facility]       Patient was evaluated by DOTTIE Hartman and Dr. Minaya.  Final assessment and recommendations as stated above were made by Dr. Minaya.    This note was created with the assistance of [employer] voice recognition software. There may be transcription errors as a result of using this technology, however minimal. Effort has been made to ensure accuracy of transcription, but any obvious errors or omissions should be clarified with the author of the document.

## 2024-06-23 NOTE — PROGRESS NOTES
Maternal Fetal Medicine Follow Up    Subjective:     Patient ID: [id number]    Chief Complaint: high risk pregnancy followup    HPI: Annie Anderson is a 23 y.o. female  at 35w4d gestation with Estimated Date of Delivery: 24  who is here for follow-up consultation by MFM.    She has chronic hypertension diagnosed after 2019 pregnancy. She was previously on labetalol and low dose aspirin, but on  had been reporting off meds the previous month. She remains off of both medications at this time as it was too late to restart the low dose asa and BP was in normal range off medication. She has history of 2 indicated  deliveries, one at 26 weeks in her 2nd pregnancy and the other at 25 weeks in her 4th pregnancy. In her  pregnancy, she had eclampsia requiring delivery at 26 weeks. In her  pregnancy, she had indicated  delivery at 25 weeks due to preeclampsia, fetal growth restriction, and oligohydramnios. She has had 3 total C-sections, at least 1 of which was a classical .  This is a short interval pregnancy, with her last delivery in 2023 that was a classical .  She had increased BMI of 41 at initial OB visit.      Interval history since last MFM visit: None.. She denies any leaking fluid, vaginal bleeding, contractions, decreased fetal movement. Denies headaches, visual disturbances, or epigastric pain.    Pregnancy complications include:   Patient Active Problem List   Diagnosis    Pre-existing essential hypertension complicating pregnancy in third trimester    Hx of preeclampsia, prior pregnancy, currently pregnant    Prior pregnancy complicated by IUGR, antepartum    At high risk for complications of intrauterine pregnancy (IUP)    Grand multiparity with current pregnancy in third trimester    Increased BMI complicating pregnancy in third trimester    Pregnancy affected by fetal growth restriction    Intrauterine growth restriction affecting care of  "mother, antepartum, third trimester, fetus 1       No changes to medical, surgical, family, social, or obstetric history.    Medications:  Current Outpatient Medications   Medication Instructions    albuterol sulfate 90 mcg/actuation aebs Inhale into the lungs.    aspirin (ECOTRIN) 81 mg, Oral, Daily, Start after 12 weeks gestation.    labetaloL (NORMODYNE) 200 mg, 3 times daily    prenatal vit no.124-iron-folic 27 mg iron- 800 mcg Tab Take by mouth.       Review of Systems   12 point review of systems conducted, negative except as stated in the history of present illness. See HPI for details.      Objective:     Visit Vitals  /77 (BP Location: Left arm, Patient Position: Sitting, BP Method: Medium (Automatic))   Pulse 87   Ht 5' 6" (1.676 m)   Wt 116.1 kg (256 lb)   LMP 10/19/2023 (Exact Date)   BMI 41.32 kg/m²        Physical Exam  Vitals and nursing note reviewed.   Constitutional:       Appearance: Normal appearance.   HENT:      Head: Normocephalic and atraumatic.   Cardiovascular:      Rate and Rhythm: Normal rate and regular rhythm.   Pulmonary:      Effort: Pulmonary effort is normal. No respiratory distress.      Breath sounds: Normal breath sounds.   Abdominal:      Palpations: Abdomen is soft.      Tenderness: There is no abdominal tenderness.   Musculoskeletal:      Right lower leg: No edema.      Left lower leg: No edema.   Neurological:      Mental Status: She is alert and oriented to person, place, and time.   Psychiatric:         Mood and Affect: Mood normal.         Behavior: Behavior normal.         Thought Content: Thought content normal.         Judgment: Judgment normal.       Assessment/Plan:     23 y.o.  female with IUP at 35w4d    Chronic hypertension in pregnancy  There is normal fetal growth with an EFW of 2203 g at the 8% and the AC at the 3% on 24  AFV is normal. BPP is 8/8 today (2024).   Normal umbilical artery doppler today, 2024     Based on findings of " recent CHAP Study, it is recommended to utilize 140/90 as the threshold for initiation or titration of medical therapy for chronic hypertension in pregnancy.Commonly used medications include nifedipine and labetalol.    BP Readings from Last 1 Encounters:   24 119/77   With this BP, she was advised to follow a low-sodium diet and will continue to stay off on medication this time.      She was advised to continue to check the BP at home. Reviewed reportable ranges.     Continue follow-up ultrasounds to monitor growth every 4 weeks until the end of pregnancy.     Fetal surveillance as below    Delivery as below with history of classical .      History of fetal growth restriction in 3 previous pregnancies  Will continue to assess fetal growth as above. Increased surveillance to be done if any evidence of slowing fetal growth.       History of severe preeclampsia and fetal growth restriction requiring delivery at 25 weeks in previous pregnancy  She was initially on low dose asa, but self discontinued and it was too late at notification to restart. Preeclampsia precautions reviewed.       History of 3 previous C-sections (1 classical )  Patient is aware of the risk of uterine rupture around 5% with labor and need to avoid that close to term. Reviewed instructions to come in immediately, if increased cramps (more than 5 per hour), increased vaginal discharge and or spotting that are not resolving with hydration and rest.      With chronic hypertension on medication, history of fetal growth restriction and severe preeclampsia requiring delivery at 25 weeks via classical , and short interval pregnancy, recommend delivery at 36 weeks' gestation (36-36 6/7th weeks) as optimal balance between prematurity risks and risks of continued pregnancy with a course of steroids to be done within the week of delivery.  She plans to get steroids tomorrow and the next day and has delivery planned for  2024.  Earlier delivery may be needed for worsening maternal or fetal status.        Short interval pregnancy  A short IPI is associated with increased risk of maternal anemia, PPROM/PTB, delivery of a SGA infant, and placental abruption. Additionally, the risk of recurrent preeclampsia is higher with an IPI of < 12 months.   Additionally, the risk of uterine rupture is higher in women with a short interdelivery interval (< 18 months- birth to birth interval), especially for her with previous classical  section. Reviewed PTL precautions.       Increased BMI in pregnancy  Body mass index is 41.32 kg/m². With relatively stable weight recently, she was advised to continue healthy low caloric and low salt diet.  Excess weight gain would be associated with worsening hypertension, gestational diabetes and adverse  outcomes, including fetal demise in utero.    Reviewed importance of FKC 3/day and prn with instructions to immediately report any decreased fetal movement.    It is important to lose weight after the pregnancy is over, especially before a future pregnancy. Breastfeeding may be an important tool in reducing the postpartum weight retention. Fetal risks were discussed with short term risk of fetal/ obesity and long term risk of adolescent component of metabolic syndrome.    Preoperative antibiotics and thromboprophylaxis with use of pneumatic compression devices is recommend in those with very elevated BMI undergoing  delivery. Thromboprophylaxis should also be used with those on prolonged immobilization.      Grand multiparity  Consider using prophylactic dose of uterotonic agents after delivery to reduce the risk of postpartum hemorrhage.    Recommend flagging the chart on admission as high risk for postpartum hemorrhage to have blood type and screened and be prepared in the event that that happens.     Fetal growth restriction  I discussed potential etiologies of FGR  include but are not limited to normal variation of stature, placental insufficiency, chromosomal abnormalities, genetic disorders, infections, medical conditions, teratogen exposure and other etiologies.  We discussed the increased risk of stillbirth and the potential need for earlier delivery.The potential for constitutional factor as a contributing factor was addressed in addition to other contributing factors such as conditions predisposing to vascular disease such as pregestational diabetes, chronic renal disease, autoimmune disorders; umbilical cord abnormalities; substance use; and multiple gestation. Although uteroplacental insufficiency and/or constitutional factors (if relevant) are the likely etiology, the potential for anomalies not seen with the ultrasound, aneuploidy, or in-utero viral infections are there, but these are very unlikely to be the cause with no other ultrasound findings. Based on a study in 2018, there is an abnormal microarray in 3% of isolated FGR. I discussed and offered genetic amniocentesis, to check for microarray and CMV was offered. The benefits and risks were discussed. She declined amniocentesis . She was advised of need for  evaluation.    She was counseled on Cell free DNA understanding that it is an enhanced screening test but not a diagnostic test. It assesses risk for common aneuploidies such as Trisomy 13, 18, and 21 by evaluating cell-free fetal DNA in maternal circulation with a false positive rate less than 0.5% for Trisomy 21 and less reliable for Trisomy 13 and Trisomy 18. She declined cell free DNA .    Complications of growth-restricted neonates include hypoglycemia, hyperbilirubinemia, hypothermia, seizures, sepsis, IVH, RDS, necrotizing enterocolitis, and  asphyxia. Long-term complications include cognitive delay and adult diseases such as cardiovascular disorders and type 2 diabetes. There is an increased risk (~20%) for recurrence of fetal growth  restriction in a future pregnancy.    Because of increased risk of stillbirth, recommend continue fetal testing as planned with primary Ob until delivery She was instructed that fetal testing is not a 100% protective against the risk of fetal demise in-utero. The importance of doing fetal kick counts three times a day and resting in a lateral recumbent position and reporting immediately at any time there is any decreased fetal movement even if the same day of testing was emphasized. Her questions were answered.    Delivery is not indicated at this time, as risks of  mortality and morbidity with delivery, outweigh risks with continued pregnancy.  Recommend delivery as above with risk factors as outlined.  Earlier delivery may be needed if worsening fetal growth, abnormal doppler, or abnormal fetal testing or other concerns.        New diagnosis of mild fetal growth restriction.  Patient was counseled on the potential etiologies of fetal growth restriction and chronic hypertension being the most likely etiology with a high-risk of development of superimposed preeclampsia over the next week before delivery or after delivery.  Preeclampsia warnings reviewed.  Being so close to delivery agreed to avoid any additional genetic testing or amniocentesis.  Patient will be seeing Dr. Day this week and get a course of steroids by arrived to plan delivery next Monday.  Fetal kick count and preeclampsia warnings    Follow up for No follow up scheduled.  Keep F/U with primary OB.     No future appointments.    Patient was evaluated by Carie Seymour, YOSELIN, and and Dr. Minaya.  Final assessment and recommendations as stated above were made by Dr. Minaya.      This note was created with the assistance of [employer] voice recognition software. There may be transcription errors as a result of using this technology, however minimal. Effort has been made to ensure accuracy of transcription, but any obvious errors or omissions  should be clarified with the author of the document.

## 2024-06-24 ENCOUNTER — PROCEDURE VISIT (OUTPATIENT)
Dept: MATERNAL FETAL MEDICINE | Facility: CLINIC | Age: 24
End: 2024-06-24
Payer: MEDICAID

## 2024-06-24 ENCOUNTER — OFFICE VISIT (OUTPATIENT)
Dept: MATERNAL FETAL MEDICINE | Facility: CLINIC | Age: 24
End: 2024-06-24
Payer: MEDICAID

## 2024-06-24 VITALS
HEART RATE: 87 BPM | WEIGHT: 256 LBS | DIASTOLIC BLOOD PRESSURE: 77 MMHG | HEIGHT: 66 IN | SYSTOLIC BLOOD PRESSURE: 119 MMHG | BODY MASS INDEX: 41.14 KG/M2

## 2024-06-24 DIAGNOSIS — O09.43 GRAND MULTIPARITY WITH CURRENT PREGNANCY IN THIRD TRIMESTER: ICD-10-CM

## 2024-06-24 DIAGNOSIS — O09.90 AT HIGH RISK FOR COMPLICATIONS OF INTRAUTERINE PREGNANCY (IUP): ICD-10-CM

## 2024-06-24 DIAGNOSIS — O09.90 AT HIGH RISK FOR COMPLICATIONS OF INTRAUTERINE PREGNANCY (IUP): Primary | ICD-10-CM

## 2024-06-24 DIAGNOSIS — O36.5931 INTRAUTERINE GROWTH RESTRICTION AFFECTING CARE OF MOTHER, ANTEPARTUM, THIRD TRIMESTER, FETUS 1: ICD-10-CM

## 2024-06-24 DIAGNOSIS — O36.5990 PREGNANCY AFFECTED BY FETAL GROWTH RESTRICTION: ICD-10-CM

## 2024-06-24 DIAGNOSIS — O09.299 HX OF PREECLAMPSIA, PRIOR PREGNANCY, CURRENTLY PREGNANT: ICD-10-CM

## 2024-06-24 DIAGNOSIS — O09.299 PRIOR PREGNANCY COMPLICATED BY IUGR, ANTEPARTUM: ICD-10-CM

## 2024-06-24 DIAGNOSIS — O10.013 PRE-EXISTING ESSENTIAL HYPERTENSION COMPLICATING PREGNANCY IN THIRD TRIMESTER: ICD-10-CM

## 2024-06-24 DIAGNOSIS — O99.213 OBESITY AFFECTING PREGNANCY IN THIRD TRIMESTER, UNSPECIFIED OBESITY TYPE: ICD-10-CM

## 2024-06-24 PROCEDURE — 3008F BODY MASS INDEX DOCD: CPT | Mod: CPTII,S$GLB,, | Performed by: OBSTETRICS & GYNECOLOGY

## 2024-06-24 PROCEDURE — 1160F RVW MEDS BY RX/DR IN RCRD: CPT | Mod: CPTII,S$GLB,, | Performed by: OBSTETRICS & GYNECOLOGY

## 2024-06-24 PROCEDURE — 76819 FETAL BIOPHYS PROFIL W/O NST: CPT | Mod: S$GLB,,, | Performed by: OBSTETRICS & GYNECOLOGY

## 2024-06-24 PROCEDURE — 3078F DIAST BP <80 MM HG: CPT | Mod: CPTII,S$GLB,, | Performed by: OBSTETRICS & GYNECOLOGY

## 2024-06-24 PROCEDURE — 1159F MED LIST DOCD IN RCRD: CPT | Mod: CPTII,S$GLB,, | Performed by: OBSTETRICS & GYNECOLOGY

## 2024-06-24 PROCEDURE — 99214 OFFICE O/P EST MOD 30 MIN: CPT | Mod: TH,S$GLB,, | Performed by: OBSTETRICS & GYNECOLOGY

## 2024-06-24 PROCEDURE — 3074F SYST BP LT 130 MM HG: CPT | Mod: CPTII,S$GLB,, | Performed by: OBSTETRICS & GYNECOLOGY

## 2024-06-24 PROCEDURE — 76816 OB US FOLLOW-UP PER FETUS: CPT | Mod: S$GLB,,, | Performed by: OBSTETRICS & GYNECOLOGY

## 2024-06-24 PROCEDURE — 76820 UMBILICAL ARTERY ECHO: CPT | Mod: S$GLB,,, | Performed by: OBSTETRICS & GYNECOLOGY

## 2024-06-25 ENCOUNTER — HOSPITAL ENCOUNTER (OUTPATIENT)
Facility: HOSPITAL | Age: 24
LOS: 1 days | Discharge: HOME | End: 2024-06-25
Attending: OBSTETRICS & GYNECOLOGY | Admitting: OBSTETRICS & GYNECOLOGY
Payer: MEDICAID

## 2024-06-25 DIAGNOSIS — Z34.90 PREGNANCY: ICD-10-CM

## 2024-06-25 PROCEDURE — 63600175 PHARM REV CODE 636 W HCPCS: Performed by: OBSTETRICS & GYNECOLOGY

## 2024-06-25 RX ORDER — BETAMETHASONE SODIUM PHOSPHATE AND BETAMETHASONE ACETATE 3; 3 MG/ML; MG/ML
12 INJECTION, SUSPENSION INTRA-ARTICULAR; INTRALESIONAL; INTRAMUSCULAR; SOFT TISSUE ONCE
Status: COMPLETED | OUTPATIENT
Start: 2024-06-25 | End: 2024-06-25

## 2024-06-25 RX ADMIN — BETAMETHASONE ACETATE AND BETAMETHASONE SODIUM PHOSPHATE 12 MG: 3; 3 INJECTION, SUSPENSION INTRA-ARTICULAR; INTRALESIONAL; INTRAMUSCULAR; SOFT TISSUE at 02:06

## 2024-06-25 NOTE — NURSING
Patient here for celestone injection. Came from Dr. Day's office, pt reports having an U/S today and was told everything was normal.

## 2024-06-26 ENCOUNTER — HOSPITAL ENCOUNTER (OUTPATIENT)
Facility: HOSPITAL | Age: 24
Discharge: HOME | End: 2024-06-26
Attending: OBSTETRICS & GYNECOLOGY | Admitting: OBSTETRICS & GYNECOLOGY
Payer: MEDICAID

## 2024-06-26 ENCOUNTER — ANESTHESIA EVENT (OUTPATIENT)
Dept: OBSTETRICS AND GYNECOLOGY | Facility: HOSPITAL | Age: 24
End: 2024-06-26
Payer: MEDICAID

## 2024-06-26 VITALS — SYSTOLIC BLOOD PRESSURE: 135 MMHG | DIASTOLIC BLOOD PRESSURE: 75 MMHG | RESPIRATION RATE: 18 BRPM | HEART RATE: 101 BPM

## 2024-06-26 DIAGNOSIS — O16.9 HYPERTENSION AFFECTING PREGNANCY: ICD-10-CM

## 2024-06-26 PROCEDURE — 63600175 PHARM REV CODE 636 W HCPCS: Performed by: OBSTETRICS & GYNECOLOGY

## 2024-06-26 PROCEDURE — 96372 THER/PROPH/DIAG INJ SC/IM: CPT

## 2024-06-26 RX ORDER — BETAMETHASONE SODIUM PHOSPHATE AND BETAMETHASONE ACETATE 3; 3 MG/ML; MG/ML
12 INJECTION, SUSPENSION INTRA-ARTICULAR; INTRALESIONAL; INTRAMUSCULAR; SOFT TISSUE ONCE
Status: COMPLETED | OUTPATIENT
Start: 2024-06-26 | End: 2024-06-26

## 2024-06-26 RX ADMIN — BETAMETHASONE ACETATE AND BETAMETHASONE SODIUM PHOSPHATE 12 MG: 3; 3 INJECTION, SUSPENSION INTRA-ARTICULAR; INTRALESIONAL; INTRAMUSCULAR; SOFT TISSUE at 02:06

## 2024-06-27 LAB — PRENATAL STREP B CULTURE: POSITIVE

## 2024-07-01 ENCOUNTER — HOSPITAL ENCOUNTER (INPATIENT)
Facility: HOSPITAL | Age: 24
LOS: 2 days | Discharge: HOME | End: 2024-07-03
Attending: OBSTETRICS & GYNECOLOGY | Admitting: OBSTETRICS & GYNECOLOGY
Payer: MEDICAID

## 2024-07-01 ENCOUNTER — ANESTHESIA (OUTPATIENT)
Dept: OBSTETRICS AND GYNECOLOGY | Facility: HOSPITAL | Age: 24
End: 2024-07-01
Payer: MEDICAID

## 2024-07-01 DIAGNOSIS — O09.299 PRIOR PREGNANCY COMPLICATED BY IUGR, ANTEPARTUM: ICD-10-CM

## 2024-07-01 DIAGNOSIS — O36.5931 INTRAUTERINE GROWTH RESTRICTION AFFECTING CARE OF MOTHER, ANTEPARTUM, THIRD TRIMESTER, FETUS 1: ICD-10-CM

## 2024-07-01 DIAGNOSIS — O09.299 HX OF PREECLAMPSIA, PRIOR PREGNANCY, CURRENTLY PREGNANT: ICD-10-CM

## 2024-07-01 DIAGNOSIS — O10.013 PRE-EXISTING ESSENTIAL HYPERTENSION COMPLICATING PREGNANCY IN THIRD TRIMESTER: ICD-10-CM

## 2024-07-01 DIAGNOSIS — O09.43 GRAND MULTIPARITY WITH CURRENT PREGNANCY IN THIRD TRIMESTER: ICD-10-CM

## 2024-07-01 DIAGNOSIS — Z98.891 S/P C-SECTION: Primary | ICD-10-CM

## 2024-07-01 DIAGNOSIS — O36.5990 PREGNANCY AFFECTED BY FETAL GROWTH RESTRICTION: ICD-10-CM

## 2024-07-01 DIAGNOSIS — O09.90 AT HIGH RISK FOR COMPLICATIONS OF INTRAUTERINE PREGNANCY (IUP): ICD-10-CM

## 2024-07-01 LAB
BASOPHILS # BLD AUTO: 0.01 X10(3)/MCL
BASOPHILS NFR BLD AUTO: 0.1 %
EOSINOPHIL # BLD AUTO: 0.08 X10(3)/MCL (ref 0–0.9)
EOSINOPHIL NFR BLD AUTO: 0.9 %
ERYTHROCYTE [DISTWIDTH] IN BLOOD BY AUTOMATED COUNT: 13 % (ref 11.5–17)
GROUP & RH: NORMAL
HBV SURFACE AB SER-ACNC: 0.7 MIU/ML
HBV SURFACE AB SERPL IA-ACNC: NONREACTIVE M[IU]/ML
HBV SURFACE AG SERPL QL IA: NONREACTIVE
HCT VFR BLD AUTO: 33.9 % (ref 37–47)
HCT VFR BLD AUTO: 35.5 % (ref 37–47)
HGB BLD-MCNC: 11.2 G/DL (ref 12–16)
HGB BLD-MCNC: 11.8 G/DL (ref 12–16)
HIV 1+2 AB+HIV1 P24 AG SERPL QL IA: NONREACTIVE
IMM GRANULOCYTES # BLD AUTO: 0.04 X10(3)/MCL (ref 0–0.04)
IMM GRANULOCYTES NFR BLD AUTO: 0.5 %
INDIRECT COOMBS: NORMAL
LYMPHOCYTES # BLD AUTO: 2.62 X10(3)/MCL (ref 0.6–4.6)
LYMPHOCYTES NFR BLD AUTO: 30.7 %
MCH RBC QN AUTO: 31.3 PG (ref 27–31)
MCHC RBC AUTO-ENTMCNC: 33 G/DL (ref 33–36)
MCV RBC AUTO: 94.7 FL (ref 80–94)
MONOCYTES # BLD AUTO: 0.62 X10(3)/MCL (ref 0.1–1.3)
MONOCYTES NFR BLD AUTO: 7.3 %
NEUTROPHILS # BLD AUTO: 5.16 X10(3)/MCL (ref 2.1–9.2)
NEUTROPHILS NFR BLD AUTO: 60.5 %
NRBC BLD AUTO-RTO: 0 %
PLATELET # BLD AUTO: 287 X10(3)/MCL (ref 130–400)
PMV BLD AUTO: 11.2 FL (ref 7.4–10.4)
RBC # BLD AUTO: 3.58 X10(6)/MCL (ref 4.2–5.4)
SPECIMEN OUTDATE: NORMAL
T PALLIDUM AB SER QL: NONREACTIVE
WBC # BLD AUTO: 8.53 X10(3)/MCL (ref 4.5–11.5)

## 2024-07-01 PROCEDURE — 36004725 HC OB OR TIME LEV III - EA ADD 15 MIN: Mod: SZN

## 2024-07-01 PROCEDURE — 99900035 HC TECH TIME PER 15 MIN (STAT)

## 2024-07-01 PROCEDURE — 63600175 PHARM REV CODE 636 W HCPCS: Performed by: NURSE ANESTHETIST, CERTIFIED REGISTERED

## 2024-07-01 PROCEDURE — 86901 BLOOD TYPING SEROLOGIC RH(D): CPT | Performed by: OBSTETRICS & GYNECOLOGY

## 2024-07-01 PROCEDURE — 87340 HEPATITIS B SURFACE AG IA: CPT | Performed by: OBSTETRICS & GYNECOLOGY

## 2024-07-01 PROCEDURE — 11000001 HC ACUTE MED/SURG PRIVATE ROOM

## 2024-07-01 PROCEDURE — 25000003 PHARM REV CODE 250: Performed by: OBSTETRICS & GYNECOLOGY

## 2024-07-01 PROCEDURE — 27000492 HC SLEEVE, SCD T/L

## 2024-07-01 PROCEDURE — 86780 TREPONEMA PALLIDUM: CPT | Performed by: OBSTETRICS & GYNECOLOGY

## 2024-07-01 PROCEDURE — 63600175 PHARM REV CODE 636 W HCPCS: Performed by: OBSTETRICS & GYNECOLOGY

## 2024-07-01 PROCEDURE — 71000033 HC RECOVERY, INTIAL HOUR: Performed by: OBSTETRICS & GYNECOLOGY

## 2024-07-01 PROCEDURE — 86762 RUBELLA ANTIBODY: CPT | Performed by: OBSTETRICS & GYNECOLOGY

## 2024-07-01 PROCEDURE — 37000009 HC ANESTHESIA EA ADD 15 MINS: Performed by: OBSTETRICS & GYNECOLOGY

## 2024-07-01 PROCEDURE — 85025 COMPLETE CBC W/AUTO DIFF WBC: CPT | Performed by: OBSTETRICS & GYNECOLOGY

## 2024-07-01 PROCEDURE — 88302 TISSUE EXAM BY PATHOLOGIST: CPT | Performed by: OBSTETRICS & GYNECOLOGY

## 2024-07-01 PROCEDURE — 37000009 HC ANESTHESIA EA ADD 15 MINS: Mod: SZN

## 2024-07-01 PROCEDURE — 63600175 PHARM REV CODE 636 W HCPCS: Mod: JZ,JG | Performed by: ANESTHESIOLOGY

## 2024-07-01 PROCEDURE — 87389 HIV-1 AG W/HIV-1&-2 AB AG IA: CPT | Performed by: OBSTETRICS & GYNECOLOGY

## 2024-07-01 PROCEDURE — 86706 HEP B SURFACE ANTIBODY: CPT | Performed by: OBSTETRICS & GYNECOLOGY

## 2024-07-01 PROCEDURE — 85018 HEMOGLOBIN: CPT | Performed by: OBSTETRICS & GYNECOLOGY

## 2024-07-01 PROCEDURE — 36004725 HC OB OR TIME LEV III - EA ADD 15 MIN: Performed by: OBSTETRICS & GYNECOLOGY

## 2024-07-01 PROCEDURE — 36415 COLL VENOUS BLD VENIPUNCTURE: CPT | Performed by: OBSTETRICS & GYNECOLOGY

## 2024-07-01 PROCEDURE — 51702 INSERT TEMP BLADDER CATH: CPT

## 2024-07-01 PROCEDURE — 37000008 HC ANESTHESIA 1ST 15 MINUTES: Performed by: OBSTETRICS & GYNECOLOGY

## 2024-07-01 PROCEDURE — 36004724 HC OB OR TIME LEV III - 1ST 15 MIN: Performed by: OBSTETRICS & GYNECOLOGY

## 2024-07-01 PROCEDURE — 86850 RBC ANTIBODY SCREEN: CPT | Performed by: OBSTETRICS & GYNECOLOGY

## 2024-07-01 PROCEDURE — 0UB70ZZ EXCISION OF BILATERAL FALLOPIAN TUBES, OPEN APPROACH: ICD-10-PCS | Performed by: OBSTETRICS & GYNECOLOGY

## 2024-07-01 RX ORDER — SODIUM CITRATE AND CITRIC ACID MONOHYDRATE 334; 500 MG/5ML; MG/5ML
30 SOLUTION ORAL
Status: DISCONTINUED | OUTPATIENT
Start: 2024-07-01 | End: 2024-07-03 | Stop reason: HOSPADM

## 2024-07-01 RX ORDER — IBUPROFEN 600 MG/1
600 TABLET ORAL EVERY 6 HOURS
Status: DISCONTINUED | OUTPATIENT
Start: 2024-07-02 | End: 2024-07-03 | Stop reason: HOSPADM

## 2024-07-01 RX ORDER — ACETAMINOPHEN 325 MG/1
650 TABLET ORAL EVERY 4 HOURS PRN
Status: DISCONTINUED | OUTPATIENT
Start: 2024-07-01 | End: 2024-07-03 | Stop reason: HOSPADM

## 2024-07-01 RX ORDER — BUPIVACAINE HYDROCHLORIDE 7.5 MG/ML
INJECTION, SOLUTION EPIDURAL; RETROBULBAR
Status: COMPLETED | OUTPATIENT
Start: 2024-07-01 | End: 2024-07-01

## 2024-07-01 RX ORDER — CEFAZOLIN SODIUM 2 G/50ML
2 SOLUTION INTRAVENOUS
Status: COMPLETED | OUTPATIENT
Start: 2024-07-01 | End: 2024-07-01

## 2024-07-01 RX ORDER — MISOPROSTOL 100 UG/1
800 TABLET ORAL
Status: DISCONTINUED | OUTPATIENT
Start: 2024-07-01 | End: 2024-07-01

## 2024-07-01 RX ORDER — KETOROLAC TROMETHAMINE 30 MG/ML
30 INJECTION, SOLUTION INTRAMUSCULAR; INTRAVENOUS EVERY 6 HOURS
Status: COMPLETED | OUTPATIENT
Start: 2024-07-01 | End: 2024-07-02

## 2024-07-01 RX ORDER — MUPIROCIN 20 MG/G
OINTMENT TOPICAL
Status: DISCONTINUED | OUTPATIENT
Start: 2024-07-01 | End: 2024-07-03 | Stop reason: HOSPADM

## 2024-07-01 RX ORDER — ONDANSETRON 4 MG/1
8 TABLET, ORALLY DISINTEGRATING ORAL EVERY 8 HOURS PRN
Status: DISCONTINUED | OUTPATIENT
Start: 2024-07-01 | End: 2024-07-03 | Stop reason: HOSPADM

## 2024-07-01 RX ORDER — MORPHINE SULFATE 0.5 MG/ML
INJECTION, SOLUTION EPIDURAL; INTRATHECAL; INTRAVENOUS
Status: DISCONTINUED | OUTPATIENT
Start: 2024-07-01 | End: 2024-07-01

## 2024-07-01 RX ORDER — CARBOPROST TROMETHAMINE 250 UG/ML
250 INJECTION, SOLUTION INTRAMUSCULAR
Status: DISCONTINUED | OUTPATIENT
Start: 2024-07-01 | End: 2024-07-03 | Stop reason: HOSPADM

## 2024-07-01 RX ORDER — MORPHINE SULFATE 4 MG/ML
2 INJECTION, SOLUTION INTRAMUSCULAR; INTRAVENOUS
Status: DISCONTINUED | OUTPATIENT
Start: 2024-07-01 | End: 2024-07-03 | Stop reason: HOSPADM

## 2024-07-01 RX ORDER — SODIUM CHLORIDE, SODIUM LACTATE, POTASSIUM CHLORIDE, CALCIUM CHLORIDE 600; 310; 30; 20 MG/100ML; MG/100ML; MG/100ML; MG/100ML
INJECTION, SOLUTION INTRAVENOUS CONTINUOUS
Status: DISCONTINUED | OUTPATIENT
Start: 2024-07-01 | End: 2024-07-03 | Stop reason: HOSPADM

## 2024-07-01 RX ORDER — KETOROLAC TROMETHAMINE 30 MG/ML
INJECTION, SOLUTION INTRAMUSCULAR; INTRAVENOUS
Status: DISCONTINUED | OUTPATIENT
Start: 2024-07-01 | End: 2024-07-01

## 2024-07-01 RX ORDER — DEXAMETHASONE SODIUM PHOSPHATE 4 MG/ML
INJECTION, SOLUTION INTRA-ARTICULAR; INTRALESIONAL; INTRAMUSCULAR; INTRAVENOUS; SOFT TISSUE
Status: DISCONTINUED | OUTPATIENT
Start: 2024-07-01 | End: 2024-07-01

## 2024-07-01 RX ORDER — SIMETHICONE 80 MG
1 TABLET,CHEWABLE ORAL EVERY 4 HOURS PRN
Status: DISCONTINUED | OUTPATIENT
Start: 2024-07-01 | End: 2024-07-03 | Stop reason: HOSPADM

## 2024-07-01 RX ORDER — FENTANYL CITRATE 50 UG/ML
INJECTION, SOLUTION INTRAMUSCULAR; INTRAVENOUS
Status: DISCONTINUED | OUTPATIENT
Start: 2024-07-01 | End: 2024-07-01

## 2024-07-01 RX ORDER — DOCUSATE SODIUM 100 MG/1
200 CAPSULE, LIQUID FILLED ORAL 2 TIMES DAILY
Status: DISCONTINUED | OUTPATIENT
Start: 2024-07-01 | End: 2024-07-03 | Stop reason: HOSPADM

## 2024-07-01 RX ORDER — ONDANSETRON HYDROCHLORIDE 2 MG/ML
4 INJECTION, SOLUTION INTRAVENOUS EVERY 6 HOURS PRN
Status: DISCONTINUED | OUTPATIENT
Start: 2024-07-01 | End: 2024-07-03 | Stop reason: HOSPADM

## 2024-07-01 RX ORDER — MISOPROSTOL 100 UG/1
800 TABLET ORAL ONCE AS NEEDED
Status: DISCONTINUED | OUTPATIENT
Start: 2024-07-01 | End: 2024-07-03 | Stop reason: HOSPADM

## 2024-07-01 RX ORDER — BISACODYL 10 MG/1
10 SUPPOSITORY RECTAL ONCE AS NEEDED
Status: DISCONTINUED | OUTPATIENT
Start: 2024-07-01 | End: 2024-07-03 | Stop reason: HOSPADM

## 2024-07-01 RX ORDER — DEXTROSE, SODIUM CHLORIDE, SODIUM LACTATE, POTASSIUM CHLORIDE, AND CALCIUM CHLORIDE 5; .6; .31; .03; .02 G/100ML; G/100ML; G/100ML; G/100ML; G/100ML
INJECTION, SOLUTION INTRAVENOUS CONTINUOUS
Status: DISCONTINUED | OUTPATIENT
Start: 2024-07-01 | End: 2024-07-03 | Stop reason: HOSPADM

## 2024-07-01 RX ORDER — OXYTOCIN-SODIUM CHLORIDE 0.9% IV SOLN 30 UNIT/500ML 30-0.9/5 UT/ML-%
95 SOLUTION INTRAVENOUS ONCE
Status: COMPLETED | OUTPATIENT
Start: 2024-07-01 | End: 2024-07-01

## 2024-07-01 RX ORDER — OXYTOCIN-SODIUM CHLORIDE 0.9% IV SOLN 30 UNIT/500ML 30-0.9/5 UT/ML-%
10 SOLUTION INTRAVENOUS ONCE
Status: DISCONTINUED | OUTPATIENT
Start: 2024-07-01 | End: 2024-07-03 | Stop reason: HOSPADM

## 2024-07-01 RX ORDER — ONDANSETRON HYDROCHLORIDE 2 MG/ML
INJECTION, SOLUTION INTRAVENOUS
Status: DISCONTINUED | OUTPATIENT
Start: 2024-07-01 | End: 2024-07-01

## 2024-07-01 RX ORDER — DIPHENHYDRAMINE HYDROCHLORIDE 50 MG/ML
25 INJECTION INTRAMUSCULAR; INTRAVENOUS EVERY 4 HOURS PRN
Status: DISCONTINUED | OUTPATIENT
Start: 2024-07-01 | End: 2024-07-03 | Stop reason: HOSPADM

## 2024-07-01 RX ORDER — ADHESIVE BANDAGE
30 BANDAGE TOPICAL 2 TIMES DAILY PRN
Status: DISCONTINUED | OUTPATIENT
Start: 2024-07-02 | End: 2024-07-03 | Stop reason: HOSPADM

## 2024-07-01 RX ORDER — OXYCODONE AND ACETAMINOPHEN 10; 325 MG/1; MG/1
1 TABLET ORAL EVERY 6 HOURS PRN
Status: DISCONTINUED | OUTPATIENT
Start: 2024-07-01 | End: 2024-07-02

## 2024-07-01 RX ORDER — OXYTOCIN-SODIUM CHLORIDE 0.9% IV SOLN 30 UNIT/500ML 30-0.9/5 UT/ML-%
95 SOLUTION INTRAVENOUS ONCE
Status: DISCONTINUED | OUTPATIENT
Start: 2024-07-01 | End: 2024-07-03 | Stop reason: HOSPADM

## 2024-07-01 RX ORDER — ACETAMINOPHEN 10 MG/ML
INJECTION, SOLUTION INTRAVENOUS
Status: DISCONTINUED | OUTPATIENT
Start: 2024-07-01 | End: 2024-07-01

## 2024-07-01 RX ORDER — OXYTOCIN-SODIUM CHLORIDE 0.9% IV SOLN 30 UNIT/500ML 30-0.9/5 UT/ML-%
10 SOLUTION INTRAVENOUS ONCE AS NEEDED
Status: COMPLETED | OUTPATIENT
Start: 2024-07-01 | End: 2024-07-01

## 2024-07-01 RX ORDER — PHENYLEPHRINE HYDROCHLORIDE 10 MG/ML
INJECTION INTRAVENOUS
Status: DISCONTINUED | OUTPATIENT
Start: 2024-07-01 | End: 2024-07-01

## 2024-07-01 RX ORDER — DIPHENOXYLATE HYDROCHLORIDE AND ATROPINE SULFATE 2.5; .025 MG/1; MG/1
2 TABLET ORAL EVERY 6 HOURS PRN
Status: DISCONTINUED | OUTPATIENT
Start: 2024-07-01 | End: 2024-07-03 | Stop reason: HOSPADM

## 2024-07-01 RX ORDER — OXYTOCIN-SODIUM CHLORIDE 0.9% IV SOLN 30 UNIT/500ML 30-0.9/5 UT/ML-%
95 SOLUTION INTRAVENOUS ONCE AS NEEDED
Status: DISCONTINUED | OUTPATIENT
Start: 2024-07-01 | End: 2024-07-03 | Stop reason: HOSPADM

## 2024-07-01 RX ORDER — FAMOTIDINE 10 MG/ML
20 INJECTION INTRAVENOUS
Status: DISCONTINUED | OUTPATIENT
Start: 2024-07-01 | End: 2024-07-03 | Stop reason: HOSPADM

## 2024-07-01 RX ORDER — METHYLERGONOVINE MALEATE 0.2 MG/ML
200 INJECTION INTRAVENOUS
Status: DISCONTINUED | OUTPATIENT
Start: 2024-07-01 | End: 2024-07-01

## 2024-07-01 RX ORDER — DIPHENHYDRAMINE HCL 25 MG
25 CAPSULE ORAL EVERY 4 HOURS PRN
Status: DISCONTINUED | OUTPATIENT
Start: 2024-07-01 | End: 2024-07-03 | Stop reason: HOSPADM

## 2024-07-01 RX ORDER — CARBOPROST TROMETHAMINE 250 UG/ML
250 INJECTION, SOLUTION INTRAMUSCULAR
Status: DISCONTINUED | OUTPATIENT
Start: 2024-07-01 | End: 2024-07-01

## 2024-07-01 RX ORDER — HYDROMORPHONE HYDROCHLORIDE 2 MG/ML
1 INJECTION, SOLUTION INTRAMUSCULAR; INTRAVENOUS; SUBCUTANEOUS EVERY 4 HOURS PRN
Status: DISCONTINUED | OUTPATIENT
Start: 2024-07-01 | End: 2024-07-03 | Stop reason: HOSPADM

## 2024-07-01 RX ORDER — OXYTOCIN 10 [USP'U]/ML
10 INJECTION, SOLUTION INTRAMUSCULAR; INTRAVENOUS ONCE AS NEEDED
Status: DISCONTINUED | OUTPATIENT
Start: 2024-07-01 | End: 2024-07-03 | Stop reason: HOSPADM

## 2024-07-01 RX ORDER — METHYLERGONOVINE MALEATE 0.2 MG/ML
200 INJECTION INTRAVENOUS ONCE AS NEEDED
Status: DISCONTINUED | OUTPATIENT
Start: 2024-07-01 | End: 2024-07-03 | Stop reason: HOSPADM

## 2024-07-01 RX ADMIN — KETOROLAC TROMETHAMINE 30 MG: 30 INJECTION, SOLUTION INTRAMUSCULAR; INTRAVENOUS at 12:07

## 2024-07-01 RX ADMIN — KETOROLAC TROMETHAMINE 30 MG: 30 INJECTION, SOLUTION INTRAMUSCULAR at 05:07

## 2024-07-01 RX ADMIN — SODIUM CHLORIDE, POTASSIUM CHLORIDE, SODIUM LACTATE AND CALCIUM CHLORIDE 1000 ML: 600; 310; 30; 20 INJECTION, SOLUTION INTRAVENOUS at 11:07

## 2024-07-01 RX ADMIN — DOCUSATE SODIUM 200 MG: 100 CAPSULE, LIQUID FILLED ORAL at 09:07

## 2024-07-01 RX ADMIN — ACETAMINOPHEN 1000 MG: 10 INJECTION, SOLUTION INTRAVENOUS at 12:07

## 2024-07-01 RX ADMIN — Medication 95 MILLI-UNITS/MIN: at 01:07

## 2024-07-01 RX ADMIN — SODIUM CHLORIDE, SODIUM LACTATE, POTASSIUM CHLORIDE, CALCIUM CHLORIDE AND DEXTROSE MONOHYDRATE: 5; 600; 310; 30; 20 INJECTION, SOLUTION INTRAVENOUS at 05:07

## 2024-07-01 RX ADMIN — PHENYLEPHRINE HYDROCHLORIDE 100 MCG: 10 INJECTION INTRAVENOUS at 12:07

## 2024-07-01 RX ADMIN — ONDANSETRON 4 MG: 2 INJECTION INTRAMUSCULAR; INTRAVENOUS at 05:07

## 2024-07-01 RX ADMIN — SODIUM CHLORIDE, POTASSIUM CHLORIDE, SODIUM LACTATE AND CALCIUM CHLORIDE: 600; 310; 30; 20 INJECTION, SOLUTION INTRAVENOUS at 10:07

## 2024-07-01 RX ADMIN — Medication 900 ML/HR: at 12:07

## 2024-07-01 RX ADMIN — OXYCODONE AND ACETAMINOPHEN 1 TABLET: 10; 325 TABLET ORAL at 04:07

## 2024-07-01 RX ADMIN — CEFAZOLIN SODIUM 2 G: 2 SOLUTION INTRAVENOUS at 12:07

## 2024-07-01 RX ADMIN — TRANEXAMIC ACID 1000 MG: 100 INJECTION, SOLUTION INTRAVENOUS at 01:07

## 2024-07-01 RX ADMIN — FENTANYL CITRATE 10 MCG: 50 INJECTION, SOLUTION INTRAMUSCULAR; INTRAVENOUS at 12:07

## 2024-07-01 RX ADMIN — MORPHINE SULFATE 0.1 MG: 0.5 INJECTION, SOLUTION EPIDURAL; INTRATHECAL; INTRAVENOUS at 12:07

## 2024-07-01 RX ADMIN — SODIUM CITRATE AND CITRIC ACID MONOHYDRATE 30 ML: 500; 334 SOLUTION ORAL at 10:07

## 2024-07-01 RX ADMIN — BUPIVACAINE HYDROCHLORIDE 1.6 ML: 7.5 INJECTION, SOLUTION EPIDURAL; RETROBULBAR at 12:07

## 2024-07-01 RX ADMIN — DEXAMETHASONE SODIUM PHOSPHATE 4 MG: 4 INJECTION, SOLUTION INTRA-ARTICULAR; INTRALESIONAL; INTRAMUSCULAR; INTRAVENOUS; SOFT TISSUE at 12:07

## 2024-07-01 RX ADMIN — ONDANSETRON 4 MG: 2 INJECTION INTRAMUSCULAR; INTRAVENOUS at 12:07

## 2024-07-01 NOTE — ANESTHESIA PROCEDURE NOTES
Spinal    Diagnosis: for surgery  Patient location during procedure: OR  Start time: 7/1/2024 12:06 PM  Timeout: 7/1/2024 12:06 PM  End time: 7/1/2024 12:11 PM    Staffing  Authorizing Provider: Lenin Sun MD  Performing Provider: Lenin Sun MD    Staffing  Performed by: Lenin Sun MD  Authorized by: Lenin Sun MD    Preanesthetic Checklist  Completed: patient identified, IV checked, site marked, risks and benefits discussed, surgical consent, monitors and equipment checked, pre-op evaluation and timeout performed  Spinal Block  Patient position: sitting  Prep: ChloraPrep and Mask worn, hand hygeine performed, sterile gloves worn  Patient monitoring: heart rate, continuous pulse ox and frequent blood pressure checks  Approach: midline  Location: L3-4  Injection technique: single shot  CSF Fluid: clear free-flowing CSF  Needle  Needle type: pencil-tip (20G 1.25 inch introducer needle used to facilitate passage of spinal needle)   Needle gauge: 25 G  Needle length: 3.5 in  Additional Documentation: negative aspiration for heme, no paresthesia on injection and incremental injection  Needle localization: anatomical landmarks  Assessment  Sensory level: T4   Dermatomal levels determined by alcohol wipe  Ease of block: easy  Patient's tolerance of the procedure: comfortable throughout block (Sterile dressing applied to skin puncture site, pt returned to supine position)  Additional Notes  Fentanyl 10 mcg & Duramorph 150 mcg added to spinal.  MARCELO applied as needed.  Medications:    Medications: bupivacaine (pf) (MARCAINE) injection 0.75% - Intraspinal   1.6 mL - 7/1/2024 12:10:00 PM

## 2024-07-01 NOTE — H&P
22 yo  with hx of cs for repeat.  Refer to ob flowsheet for hx.      Vss af  Pe wnl    A) iup term       Hx of cs    P) cs today

## 2024-07-01 NOTE — ANESTHESIA PREPROCEDURE EVALUATION
2024  Annie Anderson is a 23 y.o., female.      Pre-op Assessment    I have reviewed the Patient Summary Reports.     I have reviewed the Nursing Notes. I have reviewed the NPO Status.   I have reviewed the Medications.     Review of Systems  Anesthesia Hx:  No problems with previous Anesthesia                Cardiovascular:  Exercise tolerance: good   Hypertension                                        Endocrine:        Morbid Obesity / BMI > 40      Physical Exam  General: Well nourished and Cooperative    Airway:  Mallampati: II   Mouth Opening: Normal  TM Distance: Normal  Tongue: Normal  Neck ROM: Normal ROM    Chest/Lungs:  Clear to auscultation        Anesthesia Plan  Type of Anesthesia, risks & benefits discussed:    Anesthesia Type: Spinal  Intra-op Monitoring Plan: Standard ASA Monitors  Post Op Pain Control Plan: multimodal analgesia  Informed Consent: Informed consent signed with the Patient and all parties understand the risks and agree with anesthesia plan.  All questions answered.   ASA Score: 2  Day of Surgery Review of History & Physical: H&P Update referred to the surgeon/provider.    Ready For Surgery From Anesthesia Perspective.     .  I explained anesthesia plan to patient/responsbile party if available.  Anesthesia consent done going over the material facts, risks, complications & alternatives, obtained which includes the possibility of altering the anesthesia plan.  I reviewed problem list, prior to admission medication list, appropriate labs, any workup, Xray, EKG etc noted below.  Patients condition is satisfactory to proceed with anesthesia plan unless otherwise noted (see anesthesia chart for details of the anesthesia plan carried out).      Pre-operative evaluation for Procedure(s) (LRB):   SECTION (N/A)    LMP 10/19/2023 (Exact Date)     Patient Active Problem  List   Diagnosis    Pre-existing essential hypertension complicating pregnancy in third trimester    Hx of preeclampsia, prior pregnancy, currently pregnant    Prior pregnancy complicated by IUGR, antepartum    At high risk for complications of intrauterine pregnancy (IUP)    Grand multiparity with current pregnancy in third trimester    Increased BMI complicating pregnancy in third trimester    Pregnancy affected by fetal growth restriction    Intrauterine growth restriction affecting care of mother, antepartum, third trimester, fetus 1       Review of patient's allergies indicates:   Allergen Reactions    Dog dander      Reaction is sneezing       Current Outpatient Medications   Medication Instructions    albuterol sulfate 90 mcg/actuation aebs Inhale into the lungs.    aspirin (ECOTRIN) 81 mg, Oral, Daily, Start after 12 weeks gestation.    labetaloL (NORMODYNE) 200 mg, 3 times daily    prenatal vit no.124-iron-folic 27 mg iron- 800 mcg Tab Take by mouth.       Past Surgical History:   Procedure Laterality Date     SECTION      , ,        Social History     Socioeconomic History    Marital status: Single   Tobacco Use    Smoking status: Never     Passive exposure: Current    Smokeless tobacco: Never   Substance and Sexual Activity    Alcohol use: Not Currently    Drug use: Never    Sexual activity: Yes     Partners: Male       Lab Results   Component Value Date    WBC 7.13 2024    HGB 11.5 (L) 2024    HCT 34.4 (L) 2024    MCV 95.8 (H) 2024     2024          BMP  Lab Results   Component Value Date    HCT 34.4 (L) 2024     2024    K 3.9 2024    BUN 6.9 (L) 2024    CREATININE 0.61 2024    CALCIUM 9.3 2024          Lenin Sun MD

## 2024-07-01 NOTE — PLAN OF CARE
Problem: Adult Inpatient Plan of Care  Goal: Plan of Care Review  Outcome: Progressing  Goal: Patient-Specific Goal (Individualized)  Outcome: Progressing  Goal: Absence of Hospital-Acquired Illness or Injury  Outcome: Progressing  Goal: Optimal Comfort and Wellbeing  Outcome: Progressing  Goal: Readiness for Transition of Care  Outcome: Progressing     Problem: Bariatric Environmental Safety  Goal: Safety Maintained with Care  Outcome: Progressing     Problem: Infection  Goal: Absence of Infection Signs and Symptoms  Outcome: Progressing     Problem:  Fall Injury Risk  Goal: Absence of Fall, Infant Drop and Related Injury  Outcome: Progressing     Problem:  Delivery  Goal: Bleeding is Controlled  Outcome: Met  Goal: Stable Fetal Wellbeing  Outcome: Met  Goal: Absence of Infection Signs and Symptoms  Outcome: Met  Goal: Effective Oxygenation and Ventilation  Outcome: Met     Problem: Wound  Goal: Optimal Coping  Outcome: Progressing  Goal: Optimal Functional Ability  Outcome: Progressing  Goal: Absence of Infection Signs and Symptoms  Outcome: Progressing  Goal: Improved Oral Intake  Outcome: Progressing  Goal: Optimal Pain Control and Function  Outcome: Progressing  Goal: Skin Health and Integrity  Outcome: Progressing  Goal: Optimal Wound Healing  Outcome: Progressing     Problem: Postpartum ( Delivery)  Goal: Successful Parent Role Transition  Outcome: Progressing  Goal: Hemostasis  Outcome: Progressing  Goal: Effective Bowel Elimination  Outcome: Progressing  Goal: Fluid and Electrolyte Balance  Outcome: Progressing  Goal: Absence of Infection Signs and Symptoms  Outcome: Progressing  Goal: Anesthesia/Sedation Recovery  Outcome: Progressing  Goal: Optimal Pain Control and Function  Outcome: Progressing  Goal: Nausea and Vomiting Relief  Outcome: Progressing  Goal: Effective Urinary Elimination  Outcome: Progressing  Goal: Effective Oxygenation and Ventilation  Outcome: Progressing

## 2024-07-02 LAB
BASOPHILS # BLD AUTO: 0.01 X10(3)/MCL
BASOPHILS NFR BLD AUTO: 0.1 %
EOSINOPHIL # BLD AUTO: 0.01 X10(3)/MCL (ref 0–0.9)
EOSINOPHIL NFR BLD AUTO: 0.1 %
ERYTHROCYTE [DISTWIDTH] IN BLOOD BY AUTOMATED COUNT: 12.8 % (ref 11.5–17)
HCT VFR BLD AUTO: 33.5 % (ref 37–47)
HGB BLD-MCNC: 11.3 G/DL (ref 12–16)
IMM GRANULOCYTES # BLD AUTO: 0.09 X10(3)/MCL (ref 0–0.04)
IMM GRANULOCYTES NFR BLD AUTO: 0.5 %
LYMPHOCYTES # BLD AUTO: 1.49 X10(3)/MCL (ref 0.6–4.6)
LYMPHOCYTES NFR BLD AUTO: 8.9 %
MCH RBC QN AUTO: 31.7 PG (ref 27–31)
MCHC RBC AUTO-ENTMCNC: 33.7 G/DL (ref 33–36)
MCV RBC AUTO: 93.8 FL (ref 80–94)
MONOCYTES # BLD AUTO: 0.91 X10(3)/MCL (ref 0.1–1.3)
MONOCYTES NFR BLD AUTO: 5.4 %
NEUTROPHILS # BLD AUTO: 14.28 X10(3)/MCL (ref 2.1–9.2)
NEUTROPHILS NFR BLD AUTO: 85 %
NRBC BLD AUTO-RTO: 0 %
PLATELET # BLD AUTO: 292 X10(3)/MCL (ref 130–400)
PMV BLD AUTO: 11.4 FL (ref 7.4–10.4)
RBC # BLD AUTO: 3.57 X10(6)/MCL (ref 4.2–5.4)
WBC # BLD AUTO: 16.79 X10(3)/MCL (ref 4.5–11.5)

## 2024-07-02 PROCEDURE — 25000003 PHARM REV CODE 250: Performed by: OBSTETRICS & GYNECOLOGY

## 2024-07-02 PROCEDURE — 11000001 HC ACUTE MED/SURG PRIVATE ROOM

## 2024-07-02 PROCEDURE — 85025 COMPLETE CBC W/AUTO DIFF WBC: CPT | Performed by: OBSTETRICS & GYNECOLOGY

## 2024-07-02 PROCEDURE — 63600175 PHARM REV CODE 636 W HCPCS: Performed by: OBSTETRICS & GYNECOLOGY

## 2024-07-02 PROCEDURE — 36415 COLL VENOUS BLD VENIPUNCTURE: CPT | Performed by: OBSTETRICS & GYNECOLOGY

## 2024-07-02 RX ORDER — OXYCODONE AND ACETAMINOPHEN 10; 325 MG/1; MG/1
1 TABLET ORAL EVERY 4 HOURS PRN
Status: DISCONTINUED | OUTPATIENT
Start: 2024-07-02 | End: 2024-07-03 | Stop reason: HOSPADM

## 2024-07-02 RX ADMIN — OXYCODONE AND ACETAMINOPHEN 1 TABLET: 10; 325 TABLET ORAL at 03:07

## 2024-07-02 RX ADMIN — KETOROLAC TROMETHAMINE 30 MG: 30 INJECTION, SOLUTION INTRAMUSCULAR at 05:07

## 2024-07-02 RX ADMIN — OXYCODONE AND ACETAMINOPHEN 1 TABLET: 10; 325 TABLET ORAL at 08:07

## 2024-07-02 RX ADMIN — IBUPROFEN 600 MG: 600 TABLET, FILM COATED ORAL at 11:07

## 2024-07-02 RX ADMIN — DOCUSATE SODIUM 200 MG: 100 CAPSULE, LIQUID FILLED ORAL at 06:07

## 2024-07-02 RX ADMIN — OXYCODONE AND ACETAMINOPHEN 1 TABLET: 10; 325 TABLET ORAL at 09:07

## 2024-07-02 RX ADMIN — SIMETHICONE 80 MG: 80 TABLET, CHEWABLE ORAL at 06:07

## 2024-07-02 RX ADMIN — IBUPROFEN 600 MG: 600 TABLET, FILM COATED ORAL at 06:07

## 2024-07-02 RX ADMIN — OXYCODONE AND ACETAMINOPHEN 1 TABLET: 10; 325 TABLET ORAL at 04:07

## 2024-07-02 RX ADMIN — IBUPROFEN 600 MG: 600 TABLET, FILM COATED ORAL at 01:07

## 2024-07-02 RX ADMIN — KETOROLAC TROMETHAMINE 30 MG: 30 INJECTION, SOLUTION INTRAMUSCULAR at 12:07

## 2024-07-02 NOTE — ANESTHESIA POSTPROCEDURE EVALUATION
Anesthesia Post Evaluation    Patient: Annie Anderson    Procedure(s) Performed: Procedure(s) (LRB):   SECTION, WITH TUBAL LIGATION (N/A)    Final Anesthesia Type: regional      Patient location during evaluation: labor & delivery  Patient participation: Yes- Able to Participate  Post-procedure vital signs: reviewed and stable (No complaints at this time)  Pain management: adequate      Anesthetic complications: no                No complaints at this time.      Vitals Value Taken Time   /98 24 0350   Temp 36.7 °C (98.1 °F) 24 0350   Pulse 71 24 0350   Resp 18 24 0353   SpO2 98 % 24 0350         Event Time   Out of Recovery 13:52:00         Pain/Jesus Alberto Score: Pain Rating Prior to Med Admin: 4 (2024  5:53 AM)  Jesus Alberto Score: 9 (2024  1:52 PM)

## 2024-07-02 NOTE — PLAN OF CARE
"  Problem: Adult Inpatient Plan of Care  Goal: Plan of Care Review  Outcome: Progressing  Goal: Patient-Specific Goal (Individualized)  Description: "I want to bottle feed"  Outcome: Progressing  Goal: Absence of Hospital-Acquired Illness or Injury  Outcome: Progressing  Goal: Optimal Comfort and Wellbeing  Outcome: Progressing  Goal: Readiness for Transition of Care  Outcome: Progressing     Problem: Bariatric Environmental Safety  Goal: Safety Maintained with Care  Outcome: Progressing     Problem: Infection  Goal: Absence of Infection Signs and Symptoms  Outcome: Progressing     Problem:  Fall Injury Risk  Goal: Absence of Fall, Infant Drop and Related Injury  Outcome: Progressing     Problem: Wound  Goal: Optimal Coping  Outcome: Progressing  Goal: Optimal Functional Ability  Outcome: Progressing  Goal: Absence of Infection Signs and Symptoms  Outcome: Progressing  Goal: Improved Oral Intake  Outcome: Progressing  Goal: Optimal Pain Control and Function  Outcome: Progressing  Goal: Skin Health and Integrity  Outcome: Progressing  Goal: Optimal Wound Healing  Outcome: Progressing     Problem: Postpartum ( Delivery)  Goal: Successful Parent Role Transition  Outcome: Progressing  Goal: Hemostasis  Outcome: Progressing  Goal: Effective Bowel Elimination  Outcome: Progressing  Goal: Fluid and Electrolyte Balance  Outcome: Progressing  Goal: Absence of Infection Signs and Symptoms  Outcome: Progressing  Goal: Anesthesia/Sedation Recovery  Outcome: Progressing  Goal: Optimal Pain Control and Function  Outcome: Progressing  Goal: Nausea and Vomiting Relief  Outcome: Progressing  Goal: Effective Urinary Elimination  Outcome: Progressing  Goal: Effective Oxygenation and Ventilation  Outcome: Progressing     "

## 2024-07-02 NOTE — OP NOTE
[facility]                       [address]                      [place] [address]    PATIENT NAME:      HEBERT MARTI  YOB: 2000  CSN:               [id number]  MRN:               [id number]  ADMIT DATE:        2024 09:47:00  PHYSICIAN:         Daniel Day Jr, MD                          OPERATIVE REPORT      DATE OF SURGERY:        SURGEON:  Daniel Day Jr, MD    DATE OF DELIVERY:  24    TYPE OF DELIVERY:  Repeat  section with tubal ligation.    ASSISTANT:  Marcial Day MD    PROCEDURE IN DETAIL:  The patient was taken to the operating room, where spinal   anesthesia was administered and found to be adequate. Abdomen, perineum, and   vagina were prepped and draped in the usual sterile fashion.  A Pfannenstiel   skin incision made with a knife and carried down through to the level of the   underlying fascia.  Fascial incision was then extended at the curved Mackey   scissors. Superior aspect of the rectus fascia was grasped with Kocher clamps   and dissected off the rectus muscles both superiorly and inferiorly. Rectus   muscles were  in midline.  Peritoneum was identified, entered sharply.    Peritoneal incision was then extended with both sharp and blunt dissection.    Bladder blade inserted. Vesicouterine peritoneum was identified. A bladder flap   was created digitally. Uterus was scored in a low transverse fashion, it was   carried out laterally through the finger method.  Hand was inserted into the   uterus.  Infant's head was delivered without incident.  Rest of the infant was   delivered in full.  Cord was clamped and cut.  Appropriate cord gases and cord   blood were obtained. The infant was handed off to the [facility] nurses. The   placenta was delivered manually. Uterus was then exteriorized, cleared of all   clots and debris.  Hysterotomy site was closed in 2 layers with a #1  chromic.   Excellent hemostasis was noted.  Attention was then paid to the fallopian tubes,   the knuckle of fallopian tube was grasped with a Kenny, double tied with 2-0   plain gut suture. Tube was transected.  Hemostasis was ensured. The same was   carried on the opposite side. The uterus was then returned to the abdomen.    Hemostasis was ensured on the site and the tubes.  The peritoneum was run with a   2-0 chromic. Rectus muscles reapproximated in the midline.  Fascia was run with   1 Vicryl, subcuticular tissue with 2-0 plain gut, and skin was closed with an   Insorb. The patient tolerated the procedure well.    COUNTS:  Needle, sponge, and lap counts were correct x2.        ______________________________  MD JERICHO Russell Jr/AQS  DD:  07/01/2024  Time:  09:21PM  DT:  07/01/2024  Time:  10:40PM  Job #:  [id number]      OPERATIVE REPORT

## 2024-07-03 VITALS
RESPIRATION RATE: 18 BRPM | DIASTOLIC BLOOD PRESSURE: 21 MMHG | OXYGEN SATURATION: 98 % | WEIGHT: 255 LBS | HEART RATE: 86 BPM | SYSTOLIC BLOOD PRESSURE: 118 MMHG | TEMPERATURE: 98 F | BODY MASS INDEX: 40.98 KG/M2 | HEIGHT: 66 IN

## 2024-07-03 PROBLEM — Z98.891 HISTORY OF CLASSICAL CESAREAN SECTION: Status: ACTIVE | Noted: 2024-07-03

## 2024-07-03 LAB
PSYCHE PATHOLOGY RESULT: NORMAL
RUBV IGG SERPL IA-ACNC: 1.1
RUBV IGG SERPL QL IA: POSITIVE

## 2024-07-03 PROCEDURE — 25000003 PHARM REV CODE 250: Performed by: OBSTETRICS & GYNECOLOGY

## 2024-07-03 RX ORDER — OXYCODONE AND ACETAMINOPHEN 10; 325 MG/1; MG/1
1 TABLET ORAL EVERY 6 HOURS PRN
Qty: 28 TABLET | Refills: 0 | Status: SHIPPED | OUTPATIENT
Start: 2024-07-03

## 2024-07-03 RX ADMIN — OXYCODONE AND ACETAMINOPHEN 1 TABLET: 10; 325 TABLET ORAL at 12:07

## 2024-07-03 RX ADMIN — IBUPROFEN 600 MG: 600 TABLET, FILM COATED ORAL at 11:07

## 2024-07-03 RX ADMIN — OXYCODONE AND ACETAMINOPHEN 1 TABLET: 10; 325 TABLET ORAL at 08:07

## 2024-07-03 RX ADMIN — DOCUSATE SODIUM 200 MG: 100 CAPSULE, LIQUID FILLED ORAL at 08:07

## 2024-07-03 RX ADMIN — OXYCODONE AND ACETAMINOPHEN 1 TABLET: 10; 325 TABLET ORAL at 01:07

## 2024-07-03 RX ADMIN — IBUPROFEN 600 MG: 600 TABLET, FILM COATED ORAL at 05:07

## 2024-07-03 NOTE — DISCHARGE SUMMARY
[facility]                       [address] [address]    PATIENT NAME:       HEBERT MARTI  YOB: 2000  CSN:                [id number]   MRN:                [id number]  ADMIT DATE:         2024 09:47:00  PHYSICIAN:          Daniel Day Jr, MD                          DISCHARGE SUMMARY    DATE OF DISCHARGE:  2024 00:00:00    DIAGNOSIS:  Status post repeat  and a tubal.    HOSPITAL COURSE:  The patient underwent the procedure without incident.  She was   admitted to the postpartal GYN Service where she had an uneventful and speedy   recovery.  She was ambulating tolerating a regular diet.  Her vitals were   stable.  She was afebrile.  She had normal bowel and bladder function.  She will   be discharged home on postpartum day 2.    CONDITION ON DISCHARGE:  Stable.    DIET:  Regular.    ACTIVITY:  Pelvic rest.    MEDICATIONS:  Percocet p.r.n., Motrin p.r.n.    FOLLOWUP:  Follow up with Dr. Day in 3 weeks.        ______________________________  Daniel Day Jr, MD    DJE/AQS  DD:  2024  Time:  08:24AM  DT:  2024  Time:  08:42AM  Job #:  [id number]      DISCHARGE SUMMARY

## 2024-07-03 NOTE — DISCHARGE SUMMARY
[facility]                       [address] [address]    PATIENT NAME:       HEBERT MARTI  YOB: 2000  CSN:                [id number]   MRN:                [id number]  ADMIT DATE:         2024 09:47:00  PHYSICIAN:          Daniel Day Jr, MD                          DISCHARGE SUMMARY    DATE OF DISCHARGE:      HOSPITAL COURSE:  The patient is status post repeat  and a tubal without   incident.  She was admitted to the postpartal GYN service where she had an   uneventful and speedy recovery.  She is ambulating, tolerating a regular diet.    Her vitals were stable. She was afebrile.  She had normal bowel and bladder   function.  She will be discharged home on postpartum day 2.    CONDITION ON DISCHARGE:  Stable.    DIET:  Regular.    ACTIVITY:  Pelvic rest.    MEDICATIONS:  Percocet p.r.n., Motrin p.r.n.    FOLLOWUP:  Follow up with Dr. Day in 3 weeks.        ______________________________  Daniel Day Jr, MD    DJE/AQS  DD:  2024  Time:  08:33AM  DT:  2024  Time:  09:08AM  Job #:  [id number]      DISCHARGE SUMMARY

## 2025-02-24 PROBLEM — O09.299 PRIOR PREGNANCY COMPLICATED BY IUGR, ANTEPARTUM: Status: RESOLVED | Noted: 2024-03-18 | Resolved: 2025-02-24

## 2025-02-24 PROBLEM — O09.299 HX OF PREECLAMPSIA, PRIOR PREGNANCY, CURRENTLY PREGNANT: Status: RESOLVED | Noted: 2024-03-18 | Resolved: 2025-02-24

## 2025-08-06 ENCOUNTER — HOSPITAL ENCOUNTER (EMERGENCY)
Facility: HOSPITAL | Age: 25
Discharge: HOME | End: 2025-08-06
Attending: EMERGENCY MEDICINE
Payer: MEDICAID

## 2025-08-06 VITALS
BODY MASS INDEX: 39.37 KG/M2 | SYSTOLIC BLOOD PRESSURE: 157 MMHG | HEIGHT: 66 IN | WEIGHT: 245 LBS | HEART RATE: 99 BPM | DIASTOLIC BLOOD PRESSURE: 88 MMHG | RESPIRATION RATE: 20 BRPM | OXYGEN SATURATION: 99 % | TEMPERATURE: 98 F

## 2025-08-06 DIAGNOSIS — M54.9 BACK PAIN, UNSPECIFIED BACK LOCATION, UNSPECIFIED BACK PAIN LATERALITY, UNSPECIFIED CHRONICITY: ICD-10-CM

## 2025-08-06 DIAGNOSIS — M79.673 HEEL PAIN, UNSPECIFIED LATERALITY: Primary | ICD-10-CM

## 2025-08-06 DIAGNOSIS — L03.011 PARONYCHIA OF RIGHT MIDDLE FINGER: ICD-10-CM

## 2025-08-06 LAB — B-HCG UR QL: NEGATIVE

## 2025-08-06 PROCEDURE — 25000003 PHARM REV CODE 250: Performed by: PHYSICIAN ASSISTANT

## 2025-08-06 PROCEDURE — 10060 I&D ABSCESS SIMPLE/SINGLE: CPT

## 2025-08-06 PROCEDURE — 99284 EMERGENCY DEPT VISIT MOD MDM: CPT | Mod: 25

## 2025-08-06 PROCEDURE — 81025 URINE PREGNANCY TEST: CPT | Performed by: PHYSICIAN ASSISTANT

## 2025-08-06 RX ORDER — DOXYCYCLINE 100 MG/1
100 CAPSULE ORAL 2 TIMES DAILY
Qty: 20 CAPSULE | Refills: 0 | Status: SHIPPED | OUTPATIENT
Start: 2025-08-06 | End: 2025-08-16

## 2025-08-06 RX ORDER — ACETAMINOPHEN 500 MG
1000 TABLET ORAL
Status: COMPLETED | OUTPATIENT
Start: 2025-08-06 | End: 2025-08-06

## 2025-08-06 RX ORDER — TRAMADOL HYDROCHLORIDE 50 MG/1
50 TABLET, FILM COATED ORAL EVERY 6 HOURS PRN
Qty: 12 TABLET | Refills: 0 | Status: SHIPPED | OUTPATIENT
Start: 2025-08-06

## 2025-08-06 RX ORDER — IBUPROFEN 400 MG/1
800 TABLET, FILM COATED ORAL
Status: COMPLETED | OUTPATIENT
Start: 2025-08-06 | End: 2025-08-06

## 2025-08-06 RX ORDER — IBUPROFEN 800 MG/1
800 TABLET, FILM COATED ORAL 3 TIMES DAILY
Qty: 30 TABLET | Refills: 0 | Status: SHIPPED | OUTPATIENT
Start: 2025-08-06

## 2025-08-06 RX ORDER — MUPIROCIN 20 MG/G
OINTMENT TOPICAL DAILY
Qty: 22 G | Refills: 0 | Status: SHIPPED | OUTPATIENT
Start: 2025-08-06 | End: 2025-08-16

## 2025-08-06 RX ORDER — DICLOFENAC SODIUM 10 MG/G
2 GEL TOPICAL 4 TIMES DAILY
Qty: 100 G | Refills: 0 | Status: SHIPPED | OUTPATIENT
Start: 2025-08-06 | End: 2025-08-16

## 2025-08-06 RX ADMIN — IBUPROFEN 800 MG: 400 TABLET, FILM COATED ORAL at 12:08

## 2025-08-06 RX ADMIN — ACETAMINOPHEN 1000 MG: 500 TABLET ORAL at 12:08

## (undated) DEVICE — SUT MONOCRYL 4-0 PS-1 UND

## (undated) DEVICE — CAP BABY BEANIE

## (undated) DEVICE — SUT VICRYL 3-0 OB 36 CT-1

## (undated) DEVICE — SUT 1 36IN CHROMIC GUT CTX

## (undated) DEVICE — SYS CLSR DERMABOND PRINEO 22CM

## (undated) DEVICE — SEE MEDLINE ITEM 157117

## (undated) DEVICE — SET RANGER FLD WRM STD FLO

## (undated) DEVICE — SEE MEDLINE ITEM 156931

## (undated) DEVICE — TRAY CATH FOL SIL URIMTR 16FR

## (undated) DEVICE — ELECTRODE REM PLYHSV RETURN 9

## (undated) DEVICE — PAD SANITARY OB STERILE

## (undated) DEVICE — SUT 2/0 27IN PLAIN GUT CT

## (undated) DEVICE — SUT 3/0 36IN COATED VICRYL

## (undated) DEVICE — PAD UNDERPAD 30X30

## (undated) DEVICE — SUT CHROMIC GUT 2-0 CT-1 27IN

## (undated) DEVICE — BULB SYRINGE EAR IRRIGATION

## (undated) DEVICE — BINDER ABDOM 4PANEL 12IN LG/XL

## (undated) DEVICE — SOL WATER STRL IRR 1000ML

## (undated) DEVICE — SOL NACL IRR 1000ML BTL

## (undated) DEVICE — STAPLER SKIN SUBCUTICULAR